# Patient Record
Sex: MALE | Race: BLACK OR AFRICAN AMERICAN | Employment: UNEMPLOYED | URBAN - METROPOLITAN AREA
[De-identification: names, ages, dates, MRNs, and addresses within clinical notes are randomized per-mention and may not be internally consistent; named-entity substitution may affect disease eponyms.]

---

## 2021-04-25 ENCOUNTER — HOSPITAL ENCOUNTER (EMERGENCY)
Age: 41
Discharge: HOME | End: 2021-04-25

## 2021-04-25 VITALS
HEIGHT: 75 IN | OXYGEN SATURATION: 100 % | RESPIRATION RATE: 18 BRPM | SYSTOLIC BLOOD PRESSURE: 139 MMHG | DIASTOLIC BLOOD PRESSURE: 82 MMHG | HEART RATE: 98 BPM | BODY MASS INDEX: 31.21 KG/M2 | TEMPERATURE: 97.8 F | WEIGHT: 251 LBS

## 2021-04-25 DIAGNOSIS — N48.1 BALANITIS: Primary | ICD-10-CM

## 2021-04-25 PROCEDURE — 99283 EMERGENCY DEPT VISIT LOW MDM: CPT

## 2021-04-25 RX ORDER — OXYCODONE AND ACETAMINOPHEN 7.5; 325 MG/1; MG/1
1 TABLET ORAL EVERY 4 HOURS PRN
COMMUNITY

## 2021-04-25 RX ORDER — CLOTRIMAZOLE 1 %
CREAM (GRAM) TOPICAL
Qty: 28 G | Refills: 0 | Status: SHIPPED | OUTPATIENT
Start: 2021-04-25 | End: 2021-05-02

## 2021-04-25 RX ORDER — SULFAMETHOXAZOLE AND TRIMETHOPRIM 400; 80 MG/1; MG/1
1 TABLET ORAL 2 TIMES DAILY
COMMUNITY

## 2021-04-25 SDOH — HEALTH STABILITY: MENTAL HEALTH: HOW OFTEN DO YOU HAVE A DRINK CONTAINING ALCOHOL?: NEVER

## 2021-04-25 ASSESSMENT — ENCOUNTER SYMPTOMS
VOMITING: 0
NAUSEA: 0
ABDOMINAL PAIN: 0

## 2021-04-25 NOTE — ED PROVIDER NOTES
[address] [facility]      Pt Name: Raimundo Madrid  MRN: [id number]  [place] 1980  Date of evaluation: 4/25/2021  Provider: Hari Pineda PA-C    This patient was not seen and evaluated by the attending physician No att. providers found. CHIEF COMPLAINT       Chief Complaint   Patient presents with    Rash     patient states that he woke up this AM with a rash in his \"private area\". pt. denies itching, burning, or pain. pt. states that it \"looks like a purple bruise\". HISTORYOF PRESENT ILLNESS  (Location/Symptom, Timing/Onset, Context/Setting, Quality, Duration, Modifying Factors, Severity.)   Raimundo Madrid is a 41 y.o. male who presents to the emergency department with penile rash which he woke up with this morning. He denies that it is painful or itchy. He denies any dysuria, lesions or sores, discharge, testicular pain or swelling or other symptoms. Nursing Notes were reviewedand I agree. REVIEW OF SYSTEMS    (2-9 systems for level 4, 10 or more forlevel 5)     Review of Systems   Constitutional: Negative for chills and fever. Gastrointestinal: Negative for abdominal pain, nausea and vomiting. Genitourinary: Negative for discharge, dysuria, frequency, genital sores, penile pain, penile swelling, scrotal swelling, testicular pain and urgency. Skin: Positive for rash. All other systems reviewed and are negative. Except as noted above the remainder ofthe review of systems was reviewed and negative. PAST MEDICALHISTORY   History reviewed. No pertinent past medical history. SURGICAL HISTORY           Procedure Laterality Date    ANKLE SURGERY         CURRENT MEDICATIONS       Previous Medications    OXYCODONE-ACETAMINOPHEN (PERCOCET) 7.5-325 MG PER TABLET    Take 1 tablet by mouth every 4 hours as needed for Pain.     SULFAMETHOXAZOLE-TRIMETHOPRIM (BACTRIM;SEPTRA) 400-80 MG PER TABLET    Take 1 tablet by mouth 2 times daily       ALLERGIES     Patient has no known allergies. FAMILY HISTORY     History reviewed. No pertinent family history. No family status information on file. SOCIAL HISTORY    reports that he has never smoked. He has never used smokeless tobacco. He reports current drug use. Drug: Marijuana. He reports that he does not drink alcohol. PHYSICAL EXAM    (up to 7 for level 4, 8 or more for level 5)     ED Triage Vitals [04/25/21 1754]   BP Temp Temp Source Pulse Resp SpO2 Height Weight   139/82 97.8 °F (36.6 °C) Temporal 98 18 100 % 6' 3\" (1.905 m) 251 lb (113.9 kg)       Physical Exam  Vitals signs and nursing note reviewed. Exam conducted with a chaperone present. Constitutional:       General: He is not in acute distress. Appearance: He is well-developed. HENT:      Head: Normocephalic and atraumatic. Neck:      Musculoskeletal: Neck supple. Pulmonary:      Effort: Pulmonary effort is normal. No respiratory distress. Genitourinary:     Pubic Area: No rash. Penis: Circumcised. Erythema present. No discharge or lesions. Testes: Normal.       Musculoskeletal: Normal range of motion. Skin:     General: Skin is warm and dry. Neurological:      Mental Status: He is alert and oriented to person, place, and time. Psychiatric:         Behavior: Behavior normal.              EMERGENCY DEPARTMENT COURSE and DIFFERENTIAL DIAGNOSIS/MDM:   Vitals:    Vitals:    04/25/21 1754   BP: 139/82   Pulse: 98   Resp: 18   Temp: 97.8 °F (36.6 °C)   TempSrc: Temporal   SpO2: 100%   Weight: 251 lb (113.9 kg)   Height: 6' 3\" (1.905 m)        I have evaluated this patient. My supervising physician was available for consultation. The rash is consistent with balanitis and he was treated with clotrimazole. If it does not improve within 7 days I have asked him to follow-up with urology. Discussed results, diagnosis and plan with patient and/or family. Questions addressed.

## 2024-08-10 ENCOUNTER — HOSPITAL ENCOUNTER (EMERGENCY)
Facility: HOSPITAL | Age: 44
Discharge: HOME | End: 2024-08-10
Attending: EMERGENCY MEDICINE

## 2024-08-10 ENCOUNTER — APPOINTMENT (OUTPATIENT)
Dept: CARDIOLOGY | Facility: HOSPITAL | Age: 44
End: 2024-08-10

## 2024-08-10 ENCOUNTER — APPOINTMENT (OUTPATIENT)
Dept: RADIOLOGY | Facility: HOSPITAL | Age: 44
End: 2024-08-10

## 2024-08-10 VITALS
DIASTOLIC BLOOD PRESSURE: 93 MMHG | WEIGHT: 160 LBS | RESPIRATION RATE: 17 BRPM | BODY MASS INDEX: 21.67 KG/M2 | HEIGHT: 72 IN | OXYGEN SATURATION: 100 % | SYSTOLIC BLOOD PRESSURE: 133 MMHG | HEART RATE: 64 BPM | TEMPERATURE: 98.2 F

## 2024-08-10 DIAGNOSIS — K02.9 DENTAL CAVITY: ICD-10-CM

## 2024-08-10 DIAGNOSIS — I10 HYPERTENSION, UNSPECIFIED TYPE: Primary | ICD-10-CM

## 2024-08-10 LAB
ANION GAP SERPL CALC-SCNC: 11 MMOL/L
APPEARANCE UR: CLEAR
BASOPHILS # BLD AUTO: 0.05 X10*3/UL (ref 0–0.1)
BASOPHILS NFR BLD AUTO: 0.9 %
BILIRUB UR STRIP.AUTO-MCNC: NEGATIVE MG/DL
BUN SERPL-MCNC: 12 MG/DL (ref 8–25)
CALCIUM SERPL-MCNC: 9.7 MG/DL (ref 8.5–10.4)
CHLORIDE SERPL-SCNC: 105 MMOL/L (ref 97–107)
CO2 SERPL-SCNC: 24 MMOL/L (ref 24–31)
COLOR UR: ABNORMAL
CREAT SERPL-MCNC: 0.9 MG/DL (ref 0.4–1.6)
EGFRCR SERPLBLD CKD-EPI 2021: >90 ML/MIN/1.73M*2
EOSINOPHIL # BLD AUTO: 0.25 X10*3/UL (ref 0–0.7)
EOSINOPHIL NFR BLD AUTO: 4.6 %
ERYTHROCYTE [DISTWIDTH] IN BLOOD BY AUTOMATED COUNT: 14.2 % (ref 11.5–14.5)
GLUCOSE SERPL-MCNC: 95 MG/DL (ref 65–99)
GLUCOSE UR STRIP.AUTO-MCNC: NORMAL MG/DL
HCT VFR BLD AUTO: 47 % (ref 41–52)
HGB BLD-MCNC: 15.6 G/DL (ref 13.5–17.5)
IMM GRANULOCYTES # BLD AUTO: 0.01 X10*3/UL (ref 0–0.7)
IMM GRANULOCYTES NFR BLD AUTO: 0.2 % (ref 0–0.9)
KETONES UR STRIP.AUTO-MCNC: NEGATIVE MG/DL
LEUKOCYTE ESTERASE UR QL STRIP.AUTO: ABNORMAL
LYMPHOCYTES # BLD AUTO: 2.56 X10*3/UL (ref 1.2–4.8)
LYMPHOCYTES NFR BLD AUTO: 46.8 %
MCH RBC QN AUTO: 28.6 PG (ref 26–34)
MCHC RBC AUTO-ENTMCNC: 33.2 G/DL (ref 32–36)
MCV RBC AUTO: 86 FL (ref 80–100)
MONOCYTES # BLD AUTO: 0.37 X10*3/UL (ref 0.1–1)
MONOCYTES NFR BLD AUTO: 6.8 %
NEUTROPHILS # BLD AUTO: 2.23 X10*3/UL (ref 1.2–7.7)
NEUTROPHILS NFR BLD AUTO: 40.7 %
NITRITE UR QL STRIP.AUTO: NEGATIVE
NRBC BLD-RTO: 0 /100 WBCS (ref 0–0)
PH UR STRIP.AUTO: 6.5 [PH]
PLATELET # BLD AUTO: 230 X10*3/UL (ref 150–450)
POTASSIUM SERPL-SCNC: 4 MMOL/L (ref 3.4–5.1)
PROT UR STRIP.AUTO-MCNC: NEGATIVE MG/DL
RBC # BLD AUTO: 5.46 X10*6/UL (ref 4.5–5.9)
RBC # UR STRIP.AUTO: NEGATIVE /UL
RBC #/AREA URNS AUTO: NORMAL /HPF
SODIUM SERPL-SCNC: 140 MMOL/L (ref 133–145)
SP GR UR STRIP.AUTO: 1.02
UROBILINOGEN UR STRIP.AUTO-MCNC: NORMAL MG/DL
WBC # BLD AUTO: 5.5 X10*3/UL (ref 4.4–11.3)
WBC #/AREA URNS AUTO: NORMAL /HPF

## 2024-08-10 PROCEDURE — 81001 URINALYSIS AUTO W/SCOPE: CPT | Performed by: PHYSICIAN ASSISTANT

## 2024-08-10 PROCEDURE — 87086 URINE CULTURE/COLONY COUNT: CPT | Mod: WESLAB | Performed by: PHYSICIAN ASSISTANT

## 2024-08-10 PROCEDURE — 93005 ELECTROCARDIOGRAM TRACING: CPT

## 2024-08-10 PROCEDURE — 99283 EMERGENCY DEPT VISIT LOW MDM: CPT | Mod: 25

## 2024-08-10 PROCEDURE — 71046 X-RAY EXAM CHEST 2 VIEWS: CPT | Performed by: RADIOLOGY

## 2024-08-10 PROCEDURE — 80048 BASIC METABOLIC PNL TOTAL CA: CPT | Performed by: PHYSICIAN ASSISTANT

## 2024-08-10 PROCEDURE — 36415 COLL VENOUS BLD VENIPUNCTURE: CPT | Performed by: PHYSICIAN ASSISTANT

## 2024-08-10 PROCEDURE — 85025 COMPLETE CBC W/AUTO DIFF WBC: CPT | Performed by: PHYSICIAN ASSISTANT

## 2024-08-10 PROCEDURE — 2500000002 HC RX 250 W HCPCS SELF ADMINISTERED DRUGS (ALT 637 FOR MEDICARE OP, ALT 636 FOR OP/ED): Performed by: CLINICAL NURSE SPECIALIST

## 2024-08-10 PROCEDURE — 71046 X-RAY EXAM CHEST 2 VIEWS: CPT

## 2024-08-10 PROCEDURE — 2500000001 HC RX 250 WO HCPCS SELF ADMINISTERED DRUGS (ALT 637 FOR MEDICARE OP): Performed by: CLINICAL NURSE SPECIALIST

## 2024-08-10 RX ORDER — LOSARTAN POTASSIUM 100 MG/1
100 TABLET ORAL DAILY
Qty: 30 TABLET | Refills: 0 | Status: SHIPPED | OUTPATIENT
Start: 2024-08-10 | End: 2024-09-09

## 2024-08-10 RX ORDER — PENICILLIN V POTASSIUM 250 MG/1
500 TABLET, FILM COATED ORAL ONCE
Status: COMPLETED | OUTPATIENT
Start: 2024-08-10 | End: 2024-08-10

## 2024-08-10 RX ORDER — LOSARTAN POTASSIUM 100 MG/1
100 TABLET ORAL ONCE
Status: COMPLETED | OUTPATIENT
Start: 2024-08-10 | End: 2024-08-10

## 2024-08-10 RX ORDER — PENICILLIN V POTASSIUM 500 MG/1
500 TABLET, FILM COATED ORAL 4 TIMES DAILY
Qty: 28 TABLET | Refills: 0 | Status: SHIPPED | OUTPATIENT
Start: 2024-08-10 | End: 2024-08-17

## 2024-08-10 RX ADMIN — LOSARTAN POTASSIUM 100 MG: 100 TABLET, FILM COATED ORAL at 21:56

## 2024-08-10 RX ADMIN — PENICILLIN V POTASSIUM 500 MG: 250 TABLET, FILM COATED ORAL at 22:55

## 2024-08-10 ASSESSMENT — LIFESTYLE VARIABLES
HAVE YOU EVER FELT YOU SHOULD CUT DOWN ON YOUR DRINKING: NO
EVER HAD A DRINK FIRST THING IN THE MORNING TO STEADY YOUR NERVES TO GET RID OF A HANGOVER: NO
HAVE PEOPLE ANNOYED YOU BY CRITICIZING YOUR DRINKING: NO
EVER FELT BAD OR GUILTY ABOUT YOUR DRINKING: NO
TOTAL SCORE: 0

## 2024-08-10 ASSESSMENT — PAIN SCALES - GENERAL
PAINLEVEL_OUTOF10: 0 - NO PAIN
PAINLEVEL_OUTOF10: 0 - NO PAIN

## 2024-08-10 ASSESSMENT — PAIN - FUNCTIONAL ASSESSMENT: PAIN_FUNCTIONAL_ASSESSMENT: 0-10

## 2024-08-10 ASSESSMENT — COLUMBIA-SUICIDE SEVERITY RATING SCALE - C-SSRS
6. HAVE YOU EVER DONE ANYTHING, STARTED TO DO ANYTHING, OR PREPARED TO DO ANYTHING TO END YOUR LIFE?: NO
2. HAVE YOU ACTUALLY HAD ANY THOUGHTS OF KILLING YOURSELF?: NO
1. IN THE PAST MONTH, HAVE YOU WISHED YOU WERE DEAD OR WISHED YOU COULD GO TO SLEEP AND NOT WAKE UP?: NO

## 2024-08-11 LAB — HOLD SPECIMEN: NORMAL

## 2024-08-11 NOTE — DISCHARGE INSTRUCTIONS
Continue to floss and brush and brush teeth.  Salt water gargles after eating and 4 times a day.  Follow-up with dentist  Journal your blood pressure daily  Take your blood pressure medication as directed  Return with any worsening symptoms or concerns  Follow-up with primary care physician in 2 days for reevaluation

## 2024-08-11 NOTE — ED PROVIDER NOTES
"Department of Emergency Medicine   ED  Provider Note  Admit Date/RoomTime: 8/10/2024  9:02 PM  ED Room: ST27/ST27        History of Present Illness:  Chief Complaint   Patient presents with    Hypertension     Patient is supposed to be on BP meds but hasn't francisco javier taking them          Cameron Agarwal is a 44 y.o. male history of hypertension.  Reports he feels like his blood pressure is elevated.  He has been without his medication for some time.  Due to lack of insurance.  And also reports \"I have been stupid \"denies headache blurred vision chest pain shortness of breath.  No abdominal pain nausea vomiting or diarrhea.  States he has been in his normal state of health and feels like his blood pressure is elevated.  Upon arrival blood pressure noted to be 160/111.  Patient is unsure of the name of his blood pressure medication he has a bottle at home upon review of the chart he is on losartan 100 mg daily    Review of Systems:   Pertinent positives and negatives are stated within HPI, all other systems reviewed and are negative.        --------------------------------------------- PAST HISTORY ---------------------------------------------  Past Medical History:  has no past medical history on file.  Past Surgical History:  has no past surgical history on file.  Social History:    Family History: family history is not on file.. Unless otherwise noted, family history is non contributory  The patient’s home medications have been reviewed.  Allergies: Patient has no known allergies.        ---------------------------------------------------PHYSICAL EXAM--------------------------------------    GENERAL APPEARANCE: Awake and alert.   VITAL SIGNS: As per the nurses' triage record.  Blood pressure 160/111  HEENT: Normocephalic, atraumatic. Extraocular muscles are intact. Pupils equal round and reactive to light. Conjunctiva are pink. Negative scleral icterus. Mucous membranes are moist. Tongue in the midline. Pharynx was " without erythema or exudates, uvula midline.  Right lower jawline patient has a broken off decayed molar yellow in color no fluctuant abscess to drain.  No sign of liquids angina or peritonsillar abscess.  No stridor or trismus noted.  No facial lacerations or abrasions rashes cellulitis noted.  Able to open and shut the jaw with no difficulty.  NECK: Soft Nontender and supple, full gross ROM, no meningeal signs.  CHEST: Nontender to palpation. Clear to auscultation bilaterally. No rales, rhonchi, or wheezing.   HEART: S1, S2. Regular rate and rhythm. No murmurs, gallops or rubs.  Strong and equal pulses in the extremities.   ABDOMEN: Soft, nontender, nondistended, positive bowel sounds, no palpable masses.  MUSCULCSKELETAL: Full gross active range of motion. Ambulating on own with no acute difficulties  NEUROLOGICAL: Awake, alert and oriented x 3. Power intact in the upper and lower extremities. Sensation is intact to light touch in the upper and lower extremities.   IMMUNOLOGICAL: No lymphatic streaking noted   DERM: No petechiae, rashes, or ecchymoses.          ------------------------- NURSING NOTES AND VITALS REVIEWED ---------------------------  The nursing notes within the ED encounter and vital signs as below have been reviewed by myself  BP (!) 133/93 (BP Location: Left arm, Patient Position: Sitting)   Pulse 64   Temp 36.8 °C (98.2 °F) (Temporal)   Resp 17   Ht 1.829 m (6')   Wt 72.6 kg (160 lb)   SpO2 100%   BMI 21.70 kg/m²     Oxygen Saturation Interpretation: 100% room air      The patient’s available past medical records and past encounters were reviewed.          -----------------------DIAGNOSTIC RESULTS------------------------  LABS:    Labs Reviewed   URINALYSIS WITH REFLEX CULTURE AND MICROSCOPIC - Abnormal       Result Value    Color, Urine Light-Yellow      Appearance, Urine Clear      Specific Gravity, Urine 1.021      pH, Urine 6.5      Protein, Urine NEGATIVE      Glucose, Urine Normal       Blood, Urine NEGATIVE      Ketones, Urine NEGATIVE      Bilirubin, Urine NEGATIVE      Urobilinogen, Urine Normal      Nitrite, Urine NEGATIVE      Leukocyte Esterase, Urine 75 Honorio/µL (*)    BASIC METABOLIC PANEL - Normal    Glucose 95      Sodium 140      Potassium 4.0      Chloride 105      Bicarbonate 24      Urea Nitrogen 12      Creatinine 0.90      eGFR >90      Calcium 9.7      Anion Gap 11     MICROSCOPIC ONLY, URINE - Normal    WBC, Urine 1-5      RBC, Urine NONE     URINE CULTURE   CBC WITH AUTO DIFFERENTIAL    WBC 5.5      nRBC 0.0      RBC 5.46      Hemoglobin 15.6      Hematocrit 47.0      MCV 86      MCH 28.6      MCHC 33.2      RDW 14.2      Platelets 230      Neutrophils % 40.7      Immature Granulocytes %, Automated 0.2      Lymphocytes % 46.8      Monocytes % 6.8      Eosinophils % 4.6      Basophils % 0.9      Neutrophils Absolute 2.23      Immature Granulocytes Absolute, Automated 0.01      Lymphocytes Absolute 2.56      Monocytes Absolute 0.37      Eosinophils Absolute 0.25      Basophils Absolute 0.05     URINALYSIS WITH REFLEX CULTURE AND MICROSCOPIC    Narrative:     The following orders were created for panel order Urinalysis with Reflex Culture and Microscopic.  Procedure                               Abnormality         Status                     ---------                               -----------         ------                     Urinalysis with Reflex C...[[id number]]  Abnormal            Final result               Extra Urine Gray Tube[[id number]]                            In process                   Please view results for these tests on the individual orders.   EXTRA URINE GRAY TUBE       As interpreted by me, the above displayed labs are abnormal. All other labs obtained during this visit were within normal range or not returned as of this dictation.      EKG Interpretation  2307 ECG performed on August 10, 2024 at 2221 and interpreted by me at 2225 showing a normal sinus  rhythm, no axis deviation, intervals within normal limits, early repolarization, no STEMI.  Similar to previous from June 5, 2017. [EG]           XR chest 2 views   Final Result   1. No acute cardiopulmonary process.        MACRO:   None.        Signed by: Liza Lara 8/10/2024 9:38 PM   Dictation workstation:   [id number]              XR chest 2 views   Final Result   1. No acute cardiopulmonary process.        MACRO:   None.        Signed by: Liza Lara 8/10/2024 9:38 PM   Dictation workstation:   [id number]              ------------------------------ ED COURSE/MEDICAL DECISION MAKING----------------------  Medical Decision Making:   Exam: A medically appropriate exam performed, outlined above, given the known history and presentation.    History obtained from: Review of medical record nursing notes patient      Social Determinants of Health considered during this visit: Takes care of himself at home      PAST MEDICAL HISTORY/Chronic Conditions Affecting Care     has no past medical history on file.       CC/HPI Summary, Social Determinants of health, Records Reviewed, DDx, testing done/not done, ED Course, Reassessment, disposition considerations/shared decision making with patient, consults, disposition:   Presents with concerns as blood pressure is elevated patient noted to have blood pressure elevation on initial presentation of 160/111.  He has been out of his medication.  He has no complaints.  Plan  BMP  CBC  Urine  Losartan  Pen-Vee K  Chest x-ray 1. No acute cardiopulmonary process.   EKG  Urine    Medical Decision Making/Differential Diagnosis:  Differentials include but not limited to hypertension due to noncompliance medication versus acute renal failure versus electrolyte abnormality versus tooth decay versus dental abscess.  No sign of Yunior angina or peritonsillar abscess.  No nuchal rigidity stridor or trismus noted.  No signs of toxicity.    Review  Glucose 95  Electrolytes within  normal limits  Normal renal function  Leukocytes 5.5  Hemoglobin 15.6  Leukocytes 75 in the urine otherwise unremarkable no complaints of urinary symptoms.  Sent for culture  Patient presented with feeling like his blood pressure was elevated.  On initial presentation blood pressure is elevated he has been noncompliant with his medication.  Patient chart was reviewed noted to be on losartan 100 mg daily.  Patient was given this medication in the emergency department improvement of blood pressure 143/89.  Electrolytes within normal limits normal renal function.  He is not anemic no elevation white blood cell count indicate infection he is not hypoglycemic.  Urine did show abnormal finding however no urinary symptoms we will send for culture.  EKG per attending note no ST elevation or arrhythmia noted.  Based on patient's clinical presentation symptoms consistent with hypertension history of the same will start back on home medications.  Patient also notified nursing that he had tooth pain.  No signs of Yunior angina or peritonsillar abscess.  No rashes lesions or sores noted to the face or scalp.  Right back molar broken decayed no drainable abscess..  Placed on penicillin advised him to follow-up with primary care and dentist.  He is to journal his blood pressure daily for his primary care physician.  Continue with his medication return with any worsening symptoms or concerns patient is been advised on the importance of blood pressure management.  Including risk of stroke kidney failure heart disease verbalizes understanding amenable to plan  Patient seen evaluated with attending physician Dr. Lynne   PROCEDURES  Unless otherwise noted below, none      CONSULTS:   None      ED Course as of 08/11/24 0205   Sat Aug 10, 2024   2307 ECG performed on August 10, 2024 at 2221 and interpreted by me at 2225 showing a normal sinus rhythm, no axis deviation, intervals within normal limits, early repolarization, no STEMI.   Similar to previous from June 5, 2017. [EG]      ED Course User Index  [EG] Mary Ann Peacock MD         Diagnoses as of 08/11/24 0205   Hypertension, unspecified type   Dental cavity         This patient has remained hemodynamically stable during their ED course.      Critical Care: none        Counseling:  The emergency provider has spoken with the patient and discussed today’s results, in addition to providing specific details for the plan of care and counseling regarding the diagnosis and prognosis.  Questions are answered at this time and they are agreeable with the plan.         --------------------------------- IMPRESSION AND DISPOSITION ---------------------------------    IMPRESSION  1. Hypertension, unspecified type    2. Dental cavity        DISPOSITION  Disposition: Discharge home  Patient condition is stable improved        NOTE: This report was transcribed using voice recognition software. Every effort was made to ensure accuracy; however, inadvertent computerized transcription errors may be present      DEISY Muñiz-CHERELLE  08/11/24 0205

## 2024-08-12 LAB — BACTERIA UR CULT: NO GROWTH

## 2024-08-22 ENCOUNTER — TELEPHONE (OUTPATIENT)
Dept: PRIMARY CARE | Facility: CLINIC | Age: 44
End: 2024-08-22

## 2024-08-22 DIAGNOSIS — I10 HYPERTENSION, UNSPECIFIED TYPE: ICD-10-CM

## 2024-08-22 RX ORDER — LOSARTAN POTASSIUM 100 MG/1
100 TABLET ORAL DAILY
Qty: 30 TABLET | Refills: 0 | Status: SHIPPED | OUTPATIENT
Start: 2024-08-22 | End: 2024-09-21

## 2024-10-03 ENCOUNTER — APPOINTMENT (OUTPATIENT)
Dept: PRIMARY CARE | Facility: CLINIC | Age: 44
End: 2024-10-03

## 2024-10-03 VITALS
DIASTOLIC BLOOD PRESSURE: 84 MMHG | WEIGHT: 166 LBS | BODY MASS INDEX: 22.48 KG/M2 | HEART RATE: 118 BPM | HEIGHT: 72 IN | TEMPERATURE: 98 F | SYSTOLIC BLOOD PRESSURE: 139 MMHG

## 2024-10-03 DIAGNOSIS — Z13.6 SCREENING FOR CARDIOVASCULAR CONDITION: ICD-10-CM

## 2024-10-03 DIAGNOSIS — I10 HYPERTENSION, UNSPECIFIED TYPE: ICD-10-CM

## 2024-10-03 DIAGNOSIS — Z12.11 ENCOUNTER FOR SCREENING FOR MALIGNANT NEOPLASM OF COLON: ICD-10-CM

## 2024-10-03 DIAGNOSIS — R73.02 IGT (IMPAIRED GLUCOSE TOLERANCE): ICD-10-CM

## 2024-10-03 DIAGNOSIS — I10 PRIMARY HYPERTENSION: Primary | ICD-10-CM

## 2024-10-03 DIAGNOSIS — Z72.0 TOBACCO ABUSE: ICD-10-CM

## 2024-10-03 PROCEDURE — 3079F DIAST BP 80-89 MM HG: CPT | Performed by: INTERNAL MEDICINE

## 2024-10-03 PROCEDURE — 99214 OFFICE O/P EST MOD 30 MIN: CPT | Performed by: INTERNAL MEDICINE

## 2024-10-03 PROCEDURE — 3008F BODY MASS INDEX DOCD: CPT | Performed by: INTERNAL MEDICINE

## 2024-10-03 PROCEDURE — 4004F PT TOBACCO SCREEN RCVD TLK: CPT | Performed by: INTERNAL MEDICINE

## 2024-10-03 PROCEDURE — 3075F SYST BP GE 130 - 139MM HG: CPT | Performed by: INTERNAL MEDICINE

## 2024-10-03 PROCEDURE — 99406 BEHAV CHNG SMOKING 3-10 MIN: CPT | Performed by: INTERNAL MEDICINE

## 2024-10-03 RX ORDER — LOSARTAN POTASSIUM 100 MG/1
100 TABLET ORAL DAILY
Qty: 90 TABLET | Refills: 3 | Status: SHIPPED | OUTPATIENT
Start: 2024-10-03 | End: 2025-10-03

## 2024-10-03 ASSESSMENT — ENCOUNTER SYMPTOMS
CHILLS: 0
FEVER: 0
PALPITATIONS: 0
POLYDIPSIA: 0
SHORTNESS OF BREATH: 0
COUGH: 0

## 2024-10-03 NOTE — PROGRESS NOTES
Subjective   Patient ID: Cameron Agarwal is a 44 y.o. male who presents for Follow-up (Overdue FUV).    44-year-old male presents today for overdue follow-up last seen 2 years ago he was off of his blood pressure medication for at least the last year.  He recognized that he was having symptoms possibly related to blood pressure being uncontrolled went to the ED was evaluated and treated given a 30-day supply of his previous blood pressure medication.  Since that time he is felt well had no complications or side effects no concerns specifically at this time.  Medication was reviewed and updated additional lab work ordered for routine care and patient was counseled about the option for colon cancer screening at this time based on the fact he is now over the age of 45.  He was provided education about available options and elected into considering these options and notify me at a later time about colon cancer screening preferences.  He was also screened for tobacco use, continues to smoke and is precontemplative for quitting at this time.  He is considering quitting but is not actively trying at this time.  Patient offered medication assistance or treatment assistance when the time comes that he is prepared for quitting.         Review of Systems   Constitutional:  Negative for chills and fever.   Respiratory:  Negative for cough and shortness of breath.    Cardiovascular:  Negative for chest pain and palpitations.   Endocrine: Negative for polydipsia and polyuria.       Objective   /84 (BP Location: Right arm, Patient Position: Sitting, BP Cuff Size: Large adult)   Pulse (!) 118   Temp 36.7 °C (98 °F) (Temporal)   Ht 1.829 m (6')   Wt 75.3 kg (166 lb)   BMI 22.51 kg/m²     Physical Exam  Constitutional:       Appearance: Normal appearance.   HENT:      Head: Normocephalic and atraumatic.   Eyes:      Extraocular Movements: Extraocular movements intact.      Pupils: Pupils are equal, round, and reactive to  light.   Neck:      Thyroid: No thyroid mass or thyromegaly.   Cardiovascular:      Rate and Rhythm: Normal rate and regular rhythm.   Musculoskeletal:      Cervical back: Neck supple.      Right lower leg: No edema.      Left lower leg: No edema.   Neurological:      Mental Status: He is alert.         Assessment/Plan   Assessment & Plan  Primary hypertension    Orders:    Lipid Panel; Future    Hemoglobin A1C; Future    IGT (impaired glucose tolerance)    Orders:    Lipid Panel; Future    Hemoglobin A1C; Future    Encounter for screening for malignant neoplasm of colon  Patient aware of screening options, will notify when ready       Hypertension, unspecified type    Orders:    losartan (Cozaar) 100 mg tablet; Take 1 tablet (100 mg) by mouth once daily.    Tobacco abuse  Precontemplative patient considering       Screening for cardiovascular condition    Orders:    Lipid Panel; Future    Hemoglobin A1C; Future

## 2025-03-01 ENCOUNTER — HOSPITAL ENCOUNTER (EMERGENCY)
Facility: HOSPITAL | Age: 45
Discharge: HOME | End: 2025-03-01
Payer: COMMERCIAL

## 2025-03-01 VITALS
RESPIRATION RATE: 20 BRPM | BODY MASS INDEX: 23.03 KG/M2 | DIASTOLIC BLOOD PRESSURE: 74 MMHG | OXYGEN SATURATION: 100 % | HEIGHT: 72 IN | SYSTOLIC BLOOD PRESSURE: 137 MMHG | TEMPERATURE: 98.6 F | WEIGHT: 170 LBS | HEART RATE: 84 BPM

## 2025-03-01 DIAGNOSIS — J01.90 ACUTE NON-RECURRENT SINUSITIS, UNSPECIFIED LOCATION: Primary | ICD-10-CM

## 2025-03-01 LAB
FLUAV RNA RESP QL NAA+PROBE: NOT DETECTED
FLUBV RNA RESP QL NAA+PROBE: NOT DETECTED
RSV RNA RESP QL NAA+PROBE: NOT DETECTED
SARS-COV-2 RNA RESP QL NAA+PROBE: NOT DETECTED

## 2025-03-01 PROCEDURE — 99283 EMERGENCY DEPT VISIT LOW MDM: CPT

## 2025-03-01 PROCEDURE — 87637 SARSCOV2&INF A&B&RSV AMP PRB: CPT | Performed by: CLINICAL NURSE SPECIALIST

## 2025-03-01 RX ORDER — AMOXICILLIN AND CLAVULANATE POTASSIUM 875; 125 MG/1; MG/1
1 TABLET, FILM COATED ORAL 2 TIMES DAILY
Qty: 14 TABLET | Refills: 0 | Status: SHIPPED | OUTPATIENT
Start: 2025-03-01 | End: 2025-03-01

## 2025-03-01 RX ORDER — AMOXICILLIN AND CLAVULANATE POTASSIUM 875; 125 MG/1; MG/1
1 TABLET, FILM COATED ORAL 2 TIMES DAILY
Qty: 14 TABLET | Refills: 0 | Status: SHIPPED | OUTPATIENT
Start: 2025-03-01 | End: 2025-03-08

## 2025-03-01 RX ORDER — GUAIFENESIN 100 MG/5ML
200 SOLUTION ORAL 3 TIMES DAILY PRN
Qty: 120 ML | Refills: 0 | Status: SHIPPED | OUTPATIENT
Start: 2025-03-01 | End: 2025-03-06

## 2025-03-01 RX ORDER — GUAIFENESIN 100 MG/5ML
200 SOLUTION ORAL 3 TIMES DAILY PRN
Qty: 120 ML | Refills: 0 | Status: SHIPPED | OUTPATIENT
Start: 2025-03-01 | End: 2025-03-01

## 2025-03-01 ASSESSMENT — COLUMBIA-SUICIDE SEVERITY RATING SCALE - C-SSRS
2. HAVE YOU ACTUALLY HAD ANY THOUGHTS OF KILLING YOURSELF?: NO
6. HAVE YOU EVER DONE ANYTHING, STARTED TO DO ANYTHING, OR PREPARED TO DO ANYTHING TO END YOUR LIFE?: NO
1. IN THE PAST MONTH, HAVE YOU WISHED YOU WERE DEAD OR WISHED YOU COULD GO TO SLEEP AND NOT WAKE UP?: NO

## 2025-03-01 NOTE — ED TRIAGE NOTES
Pt states that he was at Urgent Care on Thursday for flu like symptoms and congestion. Pt states that they gave him a steroid, cetrizine, and flonase. Pt states he has no relief. Pt states he was negative for COVID at that time.

## 2025-03-01 NOTE — DISCHARGE INSTRUCTIONS
Continue with your current medications.  Take prednisone with food.  Warm compresses to the face help with sinus congestion.  Salt water gargles for sore throat pain and drainage warm compresses to the face to help thin out sinuses.  Coolmist vaporizer in the home.  Tylenol Motrin for pain do not go the recommended daily dosage watch using over-the-counter medication with your high blood pressure.  Use blood pressure specific medications to help with your symptoms.  Take antibiotic until completed take with food, full glass of water in supplement yogurt into her diet up with side effect of diarrhea

## 2025-03-01 NOTE — ED PROVIDER NOTES
Department of Emergency Medicine   ED  Provider Note  Admit Date/RoomTime: 3/1/2025  8:21 AM  ED Room: ST24/ST24        History of Present Illness:  Chief Complaint   Patient presents with    Nasal Congestion         Cameron Agarwal is a 44 y.o. male history of hypertension presents to the emergency department with complaints of facial pain and pressure runny stuffy nose.  Onset of symptoms over 1 week ago patient was seen evaluated in urgent care on Thursday of this week diagnosed with viral illness given steroids Flonase and cetrizine reports that his symptoms are not getting any better.  He states when he smokes his symptoms are worse.  He denies any wheezing or shortness of breath.  No abdominal pain nausea vomiting or diarrhea.  Denies any fever or chills.  No cough.  Has tried supportive care measures at home with no improvement  Review of Systems:   Pertinent positives and negatives are stated within HPI, all other systems reviewed and are negative.        --------------------------------------------- PAST HISTORY ---------------------------------------------  Past Medical History:  has no past medical history on file.  Past Surgical History:  has no past surgical history on file.  Social History:  reports that he has been smoking cigarettes. He has never used smokeless tobacco. He reports current alcohol use. He reports that he does not use drugs.  Family History: family history is not on file.. Unless otherwise noted, family history is non contributory  The patient’s home medications have been reviewed.  Allergies: Patient has no known allergies.        ---------------------------------------------------PHYSICAL EXAM--------------------------------------    GENERAL APPEARANCE: Awake and alert.   VITAL SIGNS: As per the nurses' triage record.  Elevated blood pressure 140/98  HEENT: Normocephalic, atraumatic. Extraocular muscles are intact. Pupils equal round and reactive to light. Conjunctiva are pink.  Negative scleral icterus. Mucous membranes are moist. Tongue in the midline. Pharynx was without erythema or exudates, uvula midline.  Bilateral tympanic membranes pearly gray and intact.  No sign of otitis media otitis externa or mastoiditis.  Tenderness to palpation of the maxillary sinuses  NECK: Soft Nontender and supple, full gross ROM, no meningeal signs.  CHEST: Nontender to palpation. Clear to auscultation bilaterally. No rales, rhonchi, or wheezing.   HEART: S1, S2. Regular rate and rhythm. No murmurs, gallops or rubs.  Strong and equal pulses in the extremities.   ABDOMEN: Soft, nontender, nondistended, positive bowel sounds, no palpable masses.  MUSCULCSKELETAL: The calves are nontender to palpation.  Ambulating on own with no acute difficulties  NEUROLOGICAL: Awake, alert and oriented x 3. Power intact in the upper and lower extremities. Sensation is intact to light touch in the upper and lower extremities.   IMMUNOLOGICAL: No lymphatic streaking noted   DERM: No petechiae, rashes, or ecchymoses.          ------------------------- NURSING NOTES AND VITALS REVIEWED ---------------------------  The nursing notes within the ED encounter and vital signs as below have been reviewed by myself  BP (!) 140/98   Pulse 97   Temp 37 °C (98.6 °F) (Temporal)   Resp 20   Ht 1.829 m (6')   Wt 77.1 kg (170 lb)   SpO2 100%   BMI 23.06 kg/m²     Oxygen Saturation Interpretation: 100% room air      The patient’s available past medical records and past encounters were reviewed.          -----------------------DIAGNOSTIC RESULTS------------------------  LABS:    Labs Reviewed   SARS-COV-2 AND INFLUENZA A/B PCR - Normal       Result Value    Flu A Result Not Detected      Flu B Result Not Detected      Coronavirus 2019, PCR Not Detected      Narrative:     This assay is an FDA-cleared, in vitro diagnostic nucleic acid amplification test for the qualitative detection and differentiation of SARS CoV-2/ Influenza A/B from  nasopharyngeal specimens collected from individuals with signs and symptoms of respiratory tract infections, and has been validated for use at [facility]. Negative results do not preclude COVID-19/ Influenza A/B infections and should not be used as the sole basis for diagnosis, treatment, or other management decisions. Testing for SARS CoV-2 is recommended only for patients who meet current clinical and/or epidemiological criteria defined by federal, state, or local public health directives.   RSV PCR - Normal    RSV PCR Not Detected      Narrative:     This assay is an FDA-cleared, in vitro diagnostic nucleic acid amplification test for the detection of RSV from nasopharyngeal specimens, and has been validated for use at [facility]. Negative results do not preclude RSV infections, and should not be used as the sole basis for diagnosis, treatment, or other management decisions. If Influenza A/B and RSV PCR results are negative, testing for Parainfluenza virus, Adenovirus and Metapneumovirus is routinely performed for pediatric oncology and intensive care inpatients at [facility], and is available on other patients by placing an add-on request.           As interpreted by me, the above displayed labs are abnormal. All other labs obtained during this visit were within normal range or not returned as of this dictation.  ------------------------------ ED COURSE/MEDICAL DECISION MAKING----------------------  Medical Decision Making:   Exam: A medically appropriate exam performed, outlined above, given the known history and presentation.    History obtained from: Review of medical record nursing notes patient      Social Determinants of Health considered during this visit: Takes care of himself at home      PAST MEDICAL HISTORY/Chronic Conditions Affecting Care     has no past medical history on file.       CC/HPI Summary, Social Determinants of health, Records Reviewed, DDx,  testing done/not done, ED Course, Reassessment, disposition considerations/shared decision making with patient, consults, disposition:   Presents to the emergency department with complaints of sinus congestion onset of symptoms over a week no improvement with over-the-counter medications or steroids and allergy medications.  Differentials include not limited to viral illness versus COVID versus flu versus RSV versus acute sinusitis.  No sign of respiratory distress.  Patient is well-nourished well-hydrated no sign of sepsis or toxicity.  He is not hypoxic tachycardic or hypotensive.  Blood pressure is elevated with history of hypertension.  Advised to follow-up with primary care physician journal with blood pressure daily.  Patient has been advised to do supportive care measures at home increasing fluids warm compresses to the face Tylenol Motrin for pain continue with his over-the-counter Flonase nasal spray steroids take with food risk and benefits of steroids reviewed with him.  In his cetrizine .  Coolmist vaporizer in the home avoid smoking close follow-up with primary care based on patient's clinical presentation history and symptoms consistent with sinusitis likely bacterial due to no improvement with over-the-counter medications steroids allergy medications.  Placed on antibiotic therapy Augmentin advised to take with food full glass 1 in supplement yogurt into diet.  Side effect of diarrhea.  COVID flu RSV negative.  Patient is amenable to plan amenable to discharge CMT for discharge  patient seen independently attending physician available for consultation if needed        PROCEDURES  Unless otherwise noted below, none      CONSULTS:   None      ED Course as of 03/01/25 0932   Sat Mar 01, 2025   0932 Patient's pharmacy updated to pharmacy of choice prescriptions resent [TB]      ED Course User Index  [TB] DEISY Muñiz-CNP         Diagnoses as of 03/01/25 0932   Acute non-recurrent sinusitis,  unspecified location         This patient has remained hemodynamically stable during their ED course.      Critical Care: None      Counseling:  The emergency provider has spoken with the patient and discussed today’s results, in addition to providing specific details for the plan of care and counseling regarding the diagnosis and prognosis.  Questions are answered at this time and they are agreeable with the plan.         --------------------------------- IMPRESSION AND DISPOSITION ---------------------------------    IMPRESSION  1. Acute non-recurrent sinusitis, unspecified location        DISPOSITION  Disposition: Discharge home  Patient condition is stable        NOTE: This report was transcribed using voice recognition software. Every effort was made to ensure accuracy; however, inadvertent computerized transcription errors may be present      SMITH Muñiz  03/01/25 0924       SMITH Muñiz  03/01/25 0932       SMITH Muñiz  03/01/25 [id number]

## 2025-03-01 NOTE — Clinical Note
Cameron Agarwal was seen and treated in our emergency department on 3/1/2025.  He may return to work on 03/04/2025.  1-3 days if needed      If you have any questions or concerns, please don't hesitate to call.      Sherrie Carr, APRN-CNP

## 2025-04-04 ENCOUNTER — HOSPITAL ENCOUNTER (EMERGENCY)
Facility: HOSPITAL | Age: 45
Discharge: HOME | End: 2025-04-04
Payer: COMMERCIAL

## 2025-04-04 ENCOUNTER — APPOINTMENT (OUTPATIENT)
Dept: CARDIOLOGY | Facility: HOSPITAL | Age: 45
End: 2025-04-04
Payer: COMMERCIAL

## 2025-04-04 ENCOUNTER — APPOINTMENT (OUTPATIENT)
Dept: RADIOLOGY | Facility: HOSPITAL | Age: 45
End: 2025-04-04
Payer: COMMERCIAL

## 2025-04-04 VITALS
TEMPERATURE: 99.1 F | SYSTOLIC BLOOD PRESSURE: 140 MMHG | BODY MASS INDEX: 23.13 KG/M2 | DIASTOLIC BLOOD PRESSURE: 85 MMHG | OXYGEN SATURATION: 100 % | HEIGHT: 72 IN | WEIGHT: 170.8 LBS | RESPIRATION RATE: 18 BRPM | HEART RATE: 78 BPM

## 2025-04-04 DIAGNOSIS — E86.0 DEHYDRATION: ICD-10-CM

## 2025-04-04 DIAGNOSIS — M79.10 MYALGIA: Primary | ICD-10-CM

## 2025-04-04 LAB
ALBUMIN SERPL BCP-MCNC: 4.7 G/DL (ref 3.4–5)
ALP SERPL-CCNC: 51 U/L (ref 33–120)
ALT SERPL W P-5'-P-CCNC: 24 U/L (ref 10–52)
AMPHETAMINES UR QL SCN: NORMAL
ANION GAP SERPL CALCULATED.3IONS-SCNC: 11 MMOL/L (ref 10–20)
APAP SERPL-MCNC: <10 UG/ML
APPEARANCE UR: CLEAR
AST SERPL W P-5'-P-CCNC: 27 U/L (ref 9–39)
ATRIAL RATE: 94 BPM
BARBITURATES UR QL SCN: NORMAL
BASOPHILS # BLD AUTO: 0.03 X10*3/UL (ref 0–0.1)
BASOPHILS NFR BLD AUTO: 0.6 %
BENZODIAZ UR QL SCN: NORMAL
BILIRUB SERPL-MCNC: 0.3 MG/DL (ref 0–1.2)
BILIRUB UR STRIP.AUTO-MCNC: NEGATIVE MG/DL
BUN SERPL-MCNC: 6 MG/DL (ref 6–23)
BZE UR QL SCN: NORMAL
CALCIUM SERPL-MCNC: 9.9 MG/DL (ref 8.6–10.3)
CANNABINOIDS UR QL SCN: NORMAL
CHLORIDE SERPL-SCNC: 102 MMOL/L (ref 98–107)
CK SERPL-CCNC: 267 U/L (ref 0–325)
CO2 SERPL-SCNC: 29 MMOL/L (ref 21–32)
COLOR UR: COLORLESS
CREAT SERPL-MCNC: 0.85 MG/DL (ref 0.5–1.3)
D DIMER PPP FEU-MCNC: <0.19 MG/L FEU (ref 0.19–0.5)
EGFRCR SERPLBLD CKD-EPI 2021: >90 ML/MIN/1.73M*2
EOSINOPHIL # BLD AUTO: 0.22 X10*3/UL (ref 0–0.7)
EOSINOPHIL NFR BLD AUTO: 4.2 %
ERYTHROCYTE [DISTWIDTH] IN BLOOD BY AUTOMATED COUNT: 14.1 % (ref 11.5–14.5)
ETHANOL SERPL-MCNC: <10 MG/DL
FENTANYL+NORFENTANYL UR QL SCN: NORMAL
FLUAV RNA RESP QL NAA+PROBE: NOT DETECTED
FLUBV RNA RESP QL NAA+PROBE: NOT DETECTED
GLUCOSE SERPL-MCNC: 116 MG/DL (ref 74–99)
GLUCOSE UR STRIP.AUTO-MCNC: NORMAL MG/DL
HCT VFR BLD AUTO: 46.7 % (ref 41–52)
HGB BLD-MCNC: 15.6 G/DL (ref 13.5–17.5)
HOLD SPECIMEN: NORMAL
IMM GRANULOCYTES # BLD AUTO: 0.01 X10*3/UL (ref 0–0.7)
IMM GRANULOCYTES NFR BLD AUTO: 0.2 % (ref 0–0.9)
KETONES UR STRIP.AUTO-MCNC: NEGATIVE MG/DL
LEUKOCYTE ESTERASE UR QL STRIP.AUTO: NEGATIVE
LYMPHOCYTES # BLD AUTO: 1.66 X10*3/UL (ref 1.2–4.8)
LYMPHOCYTES NFR BLD AUTO: 31.9 %
MCH RBC QN AUTO: 29.1 PG (ref 26–34)
MCHC RBC AUTO-ENTMCNC: 33.4 G/DL (ref 32–36)
MCV RBC AUTO: 87 FL (ref 80–100)
METHADONE UR QL SCN: NORMAL
MONOCYTES # BLD AUTO: 0.43 X10*3/UL (ref 0.1–1)
MONOCYTES NFR BLD AUTO: 8.3 %
NEUTROPHILS # BLD AUTO: 2.85 X10*3/UL (ref 1.2–7.7)
NEUTROPHILS NFR BLD AUTO: 54.8 %
NITRITE UR QL STRIP.AUTO: NEGATIVE
NRBC BLD-RTO: 0 /100 WBCS (ref 0–0)
OPIATES UR QL SCN: NORMAL
OXYCODONE+OXYMORPHONE UR QL SCN: NORMAL
P AXIS: 80 DEGREES
P OFFSET: 206 MS
P ONSET: 153 MS
PCP UR QL SCN: NORMAL
PH UR STRIP.AUTO: 6.5 [PH]
PLATELET # BLD AUTO: 242 X10*3/UL (ref 150–450)
POTASSIUM SERPL-SCNC: 4 MMOL/L (ref 3.5–5.3)
PR INTERVAL: 136 MS
PROT SERPL-MCNC: 7.2 G/DL (ref 6.4–8.2)
PROT UR STRIP.AUTO-MCNC: NEGATIVE MG/DL
Q ONSET: 221 MS
QRS COUNT: 16 BEATS
QRS DURATION: 82 MS
QT INTERVAL: 330 MS
QTC CALCULATION(BAZETT): 412 MS
QTC FREDERICIA: 383 MS
R AXIS: 48 DEGREES
RBC # BLD AUTO: 5.37 X10*6/UL (ref 4.5–5.9)
RBC # UR STRIP.AUTO: NEGATIVE MG/DL
SALICYLATES SERPL-MCNC: <3 MG/DL
SARS-COV-2 RNA RESP QL NAA+PROBE: NOT DETECTED
SODIUM SERPL-SCNC: 138 MMOL/L (ref 136–145)
SP GR UR STRIP.AUTO: 1
T AXIS: 58 DEGREES
T OFFSET: 386 MS
UROBILINOGEN UR STRIP.AUTO-MCNC: NORMAL MG/DL
VENTRICULAR RATE: 94 BPM
WBC # BLD AUTO: 5.2 X10*3/UL (ref 4.4–11.3)

## 2025-04-04 PROCEDURE — 71046 X-RAY EXAM CHEST 2 VIEWS: CPT

## 2025-04-04 PROCEDURE — 80053 COMPREHEN METABOLIC PANEL: CPT | Performed by: CLINICAL NURSE SPECIALIST

## 2025-04-04 PROCEDURE — 93005 ELECTROCARDIOGRAM TRACING: CPT

## 2025-04-04 PROCEDURE — 99285 EMERGENCY DEPT VISIT HI MDM: CPT | Mod: 25

## 2025-04-04 PROCEDURE — 36415 COLL VENOUS BLD VENIPUNCTURE: CPT | Performed by: CLINICAL NURSE SPECIALIST

## 2025-04-04 PROCEDURE — 81003 URINALYSIS AUTO W/O SCOPE: CPT | Performed by: CLINICAL NURSE SPECIALIST

## 2025-04-04 PROCEDURE — 80307 DRUG TEST PRSMV CHEM ANLYZR: CPT | Performed by: CLINICAL NURSE SPECIALIST

## 2025-04-04 PROCEDURE — 96360 HYDRATION IV INFUSION INIT: CPT

## 2025-04-04 PROCEDURE — 85025 COMPLETE CBC W/AUTO DIFF WBC: CPT | Performed by: CLINICAL NURSE SPECIALIST

## 2025-04-04 PROCEDURE — 71046 X-RAY EXAM CHEST 2 VIEWS: CPT | Mod: FOREIGN READ | Performed by: RADIOLOGY

## 2025-04-04 PROCEDURE — 80320 DRUG SCREEN QUANTALCOHOLS: CPT | Performed by: CLINICAL NURSE SPECIALIST

## 2025-04-04 PROCEDURE — 82550 ASSAY OF CK (CPK): CPT | Performed by: CLINICAL NURSE SPECIALIST

## 2025-04-04 PROCEDURE — 85300 ANTITHROMBIN III ACTIVITY: CPT | Performed by: CLINICAL NURSE SPECIALIST

## 2025-04-04 PROCEDURE — 2500000004 HC RX 250 GENERAL PHARMACY W/ HCPCS (ALT 636 FOR OP/ED): Performed by: CLINICAL NURSE SPECIALIST

## 2025-04-04 PROCEDURE — 87636 SARSCOV2 & INF A&B AMP PRB: CPT | Performed by: CLINICAL NURSE SPECIALIST

## 2025-04-04 PROCEDURE — 2500000001 HC RX 250 WO HCPCS SELF ADMINISTERED DRUGS (ALT 637 FOR MEDICARE OP): Performed by: CLINICAL NURSE SPECIALIST

## 2025-04-04 RX ORDER — ACETAMINOPHEN 325 MG/1
650 TABLET ORAL ONCE
Status: COMPLETED | OUTPATIENT
Start: 2025-04-04 | End: 2025-04-04

## 2025-04-04 RX ADMIN — SODIUM CHLORIDE 1000 ML: 9 INJECTION, SOLUTION INTRAVENOUS at 09:38

## 2025-04-04 RX ADMIN — ACETAMINOPHEN 650 MG: 325 TABLET, FILM COATED ORAL at 09:40

## 2025-04-04 ASSESSMENT — PAIN SCALES - GENERAL: PAINLEVEL_OUTOF10: 0 - NO PAIN

## 2025-04-04 ASSESSMENT — LIFESTYLE VARIABLES
TOTAL SCORE: 0
EVER FELT BAD OR GUILTY ABOUT YOUR DRINKING: NO
EVER HAD A DRINK FIRST THING IN THE MORNING TO STEADY YOUR NERVES TO GET RID OF A HANGOVER: NO
HAVE PEOPLE ANNOYED YOU BY CRITICIZING YOUR DRINKING: NO
HAVE YOU EVER FELT YOU SHOULD CUT DOWN ON YOUR DRINKING: NO

## 2025-04-04 ASSESSMENT — PAIN - FUNCTIONAL ASSESSMENT: PAIN_FUNCTIONAL_ASSESSMENT: 0-10

## 2025-04-04 ASSESSMENT — COLUMBIA-SUICIDE SEVERITY RATING SCALE - C-SSRS
1. IN THE PAST MONTH, HAVE YOU WISHED YOU WERE DEAD OR WISHED YOU COULD GO TO SLEEP AND NOT WAKE UP?: NO
6. HAVE YOU EVER DONE ANYTHING, STARTED TO DO ANYTHING, OR PREPARED TO DO ANYTHING TO END YOUR LIFE?: NO
2. HAVE YOU ACTUALLY HAD ANY THOUGHTS OF KILLING YOURSELF?: NO

## 2025-04-04 NOTE — LETTER
Cameron Agarwal was seen and treated in our emergency department on 4/4/2025.  He may return to work on 04/06/2025.  1-3 days if needed      If you have any questions or concerns, please don't hesitate to call.      Sherrie Carr, APRN-CNP

## 2025-04-04 NOTE — DISCHARGE INSTRUCTIONS
Make sure you are drinking plenty of fluids.  Follow-up with primary care physician in 2 days for reevaluation return with any worsening symptoms or concerns  Journal your blood pressure daily.  Take your medication as directed

## 2025-04-04 NOTE — LETTER
April 4, 2025    Patient: Cameron Agarwal   YOB: 1980   Date of Visit: 4/4/2025       To Whom It May Concern:    Cameron Agarwal was seen and treated in our emergency department on 4/4/2025. He {Return to school/sport/work:99806}.    If you have any questions or concerns, please don't hesitate to call.              CC: No Recipients

## 2025-04-04 NOTE — ED PROVIDER NOTES
Department of Emergency Medicine   ED  Provider Note  Admit Date/RoomTime: 4/4/2025  9:10 AM  ED Room: ST23/ST23        History of Present Illness:  Chief Complaint   Patient presents with    Muscle Pain     Pt states that he feels as though his muscle are sore and legs are tight. States he thinks he needs blood work to see if anything is wrong. Sees his PCP on this coming Wednesday         Cameron Agarwal is a 44 y.o. male history of hypertension presents emergency department complaints of not feeling well.  Patient reports he has felt like this for the past few days.  His symptoms come and go.  He denies any fever or chills.  No cough congestion runny nose sore throat no abdominal pain no nausea vomiting no diarrhea.  No rashes or sores.  No chest pain or shortness of breath complains of intermittent numbness and tingling.  Nothing at this time.  Would like to get blood work to make sure that his electrolytes are okay.  He has appoint with his doctor on Wednesday for reevaluation.  He does take blood pressure medication and has taken it this morning.  Was noted his blood pressure was elevated but took his blood pressure medicine prior to arrival.  Denies drug use.  Drinks alcohol occasionally.    Review of Systems:   Pertinent positives and negatives are stated within HPI, all other systems reviewed and are negative.        --------------------------------------------- PAST HISTORY ---------------------------------------------  Past Medical History:  has no past medical history on file.  Past Surgical History:  has no past surgical history on file.  Social History:  reports that he has been smoking cigarettes. He has never used smokeless tobacco. He reports current alcohol use. He reports that he does not use drugs.  Family History: family history is not on file.. Unless otherwise noted, family history is non contributory  The patient’s home medications have been reviewed.  Allergies: Patient has no known  allergies.        ---------------------------------------------------PHYSICAL EXAM--------------------------------------    GENERAL APPEARANCE: Awake and alert.   VITAL SIGNS: As per the nurses' triage record.  Elevated blood pressure, tachycardia  HEENT: Normocephalic, atraumatic. Extraocular muscles are intact. Pupils equal round and reactive to light. Conjunctiva are pink. Negative scleral icterus. Mucous membranes are moist. Tongue in the midline. Pharynx was without erythema or exudates, uvula midline  NECK: Soft Nontender and supple, full gross ROM, no meningeal signs.  CHEST: Nontender to palpation. Clear to auscultation bilaterally. No rales, rhonchi, or wheezing.   HEART: S1, S2.  Tachycardia regular rate and rhythm. No murmurs, gallops or rubs.  Strong and equal pulses in the extremities.   ABDOMEN: Soft, nontender, nondistended, positive bowel sounds, no palpable masses.  MUSCULCSKELETAL: The calves are nontender to palpation. Full gross active range of motion. Ambulating on own with no acute difficulties  NEUROLOGICAL: Awake, alert and oriented x 3. Power intact in the upper and lower extremities. Sensation is intact to light touch in the upper and lower extremities.   IMMUNOLOGICAL: No lymphatic streaking noted   DERM: No petechiae, rashes, or ecchymoses.          ------------------------- NURSING NOTES AND VITALS REVIEWED ---------------------------  The nursing notes within the ED encounter and vital signs as below have been reviewed by myself  /85 (BP Location: Left arm, Patient Position: Sitting)   Pulse 78   Temp 37.3 °C (99.1 °F) (Tympanic)   Resp 18   Ht 1.829 m (6')   Wt 77.5 kg (170 lb 12.8 oz)   SpO2 100%   BMI 23.16 kg/m²     Oxygen Saturation Interpretation: 100% room air          The patient’s available past medical records and past encounters were reviewed.          -----------------------DIAGNOSTIC RESULTS------------------------  LABS:    Labs Reviewed   COMPREHENSIVE  METABOLIC PANEL - Abnormal       Result Value    Glucose 116 (*)     Sodium 138      Potassium 4.0      Chloride 102      Bicarbonate 29      Anion Gap 11      Urea Nitrogen 6      Creatinine 0.85      eGFR >90      Calcium 9.9      Albumin 4.7      Alkaline Phosphatase 51      Total Protein 7.2      AST 27      Bilirubin, Total 0.3      ALT 24     URINALYSIS WITH REFLEX CULTURE AND MICROSCOPIC - Abnormal    Color, Urine Colorless (*)     Appearance, Urine Clear      Specific Gravity, Urine 1.005      pH, Urine 6.5      Protein, Urine NEGATIVE      Glucose, Urine Normal      Blood, Urine NEGATIVE      Ketones, Urine NEGATIVE      Bilirubin, Urine NEGATIVE      Urobilinogen, Urine Normal      Nitrite, Urine NEGATIVE      Leukocyte Esterase, Urine NEGATIVE     D-DIMER, NON VTE - Abnormal    D-Dimer Non VTE, Quant (mg/L FEU) <0.19 (*)     Narrative:     THROMBOEMBOLIC EVENTS CANNOT BE EXCLUDED SOLELY ON THE BASIS OF THE D-DIMER LEVEL BEING WITHIN THE NORMAL REFERENCE RANGE. D-DIMER LEVELS LESS THAN 0.5 MG/L FEU IN CONJUNCTION WITH A LOW CLINICAL PROBABILITY HAVE AN EXCELLENT NEGATIVE PREDICTIVE VALUE IN EXCLUDING A DIAGNOSIS OF PULMONARY EMBOLUS (PE) OR DEEP VEIN THROMBOSIS (DVT). ELEVATED D-DIMER LEVELS ARE NOT SPECIFIC TO PE OR DVT, AND MAY BE SEEN IN PATIENTS WITH DIC, ADVANCED AGE, PREGNANCY, MALIGNANCY, LIVER DISEASE, INFECTION, AND INFLAMMATORY CONDITIONS AMONG OTHERS. D-DIMER LEVELS MAY BE DECREASED IN PATIENTS RECEIVING ANTI-COAGULATION THERAPY.   DRUG SCREEN,URINE - Normal    Amphetamine Screen, Urine Presumptive Negative      Barbiturate Screen, Urine Presumptive Negative      Benzodiazepines Screen, Urine Presumptive Negative      Cannabinoid Screen, Urine Presumptive Negative      Cocaine Metabolite Screen, Urine Presumptive Negative      Fentanyl Screen, Urine Presumptive Negative      Opiate Screen, Urine Presumptive Negative      Oxycodone Screen, Urine Presumptive Negative      PCP Screen, Urine  Presumptive Negative      Methadone Screen, Urine Presumptive Negative      Narrative:     Drug screen results are presumptive and should not be used to assess   compliance with prescribed medication. Contact the performing [facility] laboratory   to add-on definitive confirmatory testing if clinically indicated.    Toxicology screening results are reported qualitatively. The concentration must   be greater than or equal to the cutoff to be reported as positive. The concentration   at which the screening test can detect an individual drug or metabolite varies.   The absence of expected drug(s) and/or drug metabolite(s) may indicate non-compliance,   inappropriate timing of specimen collection relative to drug administration, poor drug   absorption, diluted/adulterated urine, or limitations of testing. For medical purposes   only; not valid for forensic use.    Interpretive questions should be directed to the laboratory medical directors.   CREATINE KINASE - Normal    Creatine Kinase 267     ACUTE TOXICOLOGY PANEL, BLOOD - Normal    Acetaminophen <10.0      Salicylate  <3      Alcohol <10     SARS-COV-2 AND INFLUENZA A/B PCR - Normal    Flu A Result Not Detected      Flu B Result Not Detected      Coronavirus 2019, PCR Not Detected      Narrative:     This assay is an FDA-cleared, in vitro diagnostic nucleic acid amplification test for the qualitative detection and differentiation of SARS CoV-2/ Influenza A/B from nasopharyngeal specimens collected from individuals with signs and symptoms of respiratory tract infections, and has been validated for use at [facility]. Negative results do not preclude COVID-19/ Influenza A/B infections and should not be used as the sole basis for diagnosis, treatment, or other management decisions. Testing for SARS CoV-2 is recommended only for patients who meet current clinical and/or epidemiological criteria defined by federal, state, or local public health  directives.   CBC WITH AUTO DIFFERENTIAL    WBC 5.2      nRBC 0.0      RBC 5.37      Hemoglobin 15.6      Hematocrit 46.7      MCV 87      MCH 29.1      MCHC 33.4      RDW 14.1      Platelets 242      Neutrophils % 54.8      Immature Granulocytes %, Automated 0.2      Lymphocytes % 31.9      Monocytes % 8.3      Eosinophils % 4.2      Basophils % 0.6      Neutrophils Absolute 2.85      Immature Granulocytes Absolute, Automated 0.01      Lymphocytes Absolute 1.66      Monocytes Absolute 0.43      Eosinophils Absolute 0.22      Basophils Absolute 0.03     URINALYSIS WITH REFLEX CULTURE AND MICROSCOPIC    Narrative:     The following orders were created for panel order Urinalysis with Reflex Culture and Microscopic.  Procedure                               Abnormality         Status                     ---------                               -----------         ------                     Urinalysis with Reflex C...[[id number]]  Abnormal            Final result               Extra Urine Gray Tube[[id number]]                            In process                   Please view results for these tests on the individual orders.   EXTRA URINE GRAY TUBE       As interpreted by me, the above displayed labs are abnormal. All other labs obtained during this visit were within normal range or not returned as of this dictation.      EKG Interpretation    0935 EKG interpreted by myself independently, EKG shows normal sinus rhythm, rate of 94 bpm, AK interval 136, QRS 82, , QTc 412, patient has no ST elevation or depression, negative for acute MI. [ARAMIS]           XR chest 2 views   Final Result   No acute findings on two-view chest.   Signed by Jevon Armendariz MD              XR chest 2 views   Final Result   No acute findings on two-view chest.   Signed by Jevon Armendariz MD              ------------------------------ ED COURSE/MEDICAL DECISION MAKING----------------------  Medical Decision Making:   Exam: A medically appropriate  exam performed, outlined above, given the known history and presentation.    History obtained from: Review of medical record nursing notes patient      Social Determinants of Health considered during this visit: Takes care of himself at home    PAST MEDICAL HISTORY/Chronic Conditions Affecting Care     has no past medical history on file.       CC/HPI Summary, Social Determinants of health, Records Reviewed, DDx, testing done/not done, ED Course, Reassessment, disposition considerations/shared decision making with patient, consults, disposition:   Presents with feeling of unwell.  Intermittent numbness and tingling soreness in his arms and legs  Plan  Tylenol, normal saline, EKG, tox screen, CK, CBC, CMP, D-dimer, drug screen, COVID flu      Medical Decision Making/Differential Diagnosis:  Differentials include but not limited to viral illness versus electrolyte normality versus dehydration.  Patient is in no acute distress at this time.  No meningeal signs noted.  Well-nourished well-hydrated.  Review  Urine unremarkable  D-dimer unremarkable    Glucose 116  Electrolytes unremarkable  Normal renal function  Normal LFTs  Drug screen negative  COVID flu negative  White blood cell count 5.2  Hemoglobin 15.6  Patient presented to the emergency department with complaints of myalgias and not feeling well.  No acute respiratory distress no signs of sepsis or toxicity he is not hypotensive tachycardic or febrile.  Chest x-ray showed No acute findings on two-view chest..  EKG per attending note normal sinus rhythm no ST elevation or arrhythmia noted.  Electrolytes unremarkable.  Normal renal function normal LFTs.  No elevation white blood cell count to indicate infection patient is not anemic.  CK within normal limits.  Drug screen negative.  Urine is not consistent with UTI.  COVID flu negative.  D-dimer unremarkable  Patient is requesting a work slip.  Based on patient's clinical presentation history and symptoms  consistent with myalgias and dehydration.  Was initially found to be tachycardic improved with fluids.   elevated blood pressure did take his blood pressure prior to arrival.  Blood pressure improved.  Advised on supportive care measures at home patient already has a scheduled appointment on Wednesday with his primary care physician for reevaluation.  Patient seen independently attending physician available consultation if needed CMT for discharge  Discharge planning patient amenable to plan amenable to discharge  PROCEDURES  Unless otherwise noted below, none      CONSULTS:   None      ED Course as of 04/04/25 1437 Fri Apr 04, 2025   0935 EKG interpreted by myself independently, EKG shows normal sinus rhythm, rate of 94 bpm, IL interval 136, QRS 82, , QTc 412, patient has no ST elevation or depression, negative for acute MI. [ARAMIS]      ED Course User Index  [ARAMIS] Fawad Allan,          Diagnoses as of 04/04/25 1437   Myalgia   Dehydration         This patient has remained hemodynamically stable during their ED course.      Critical Care: None      Counseling:  The emergency provider has spoken with the patient and discussed today’s results, in addition to providing specific details for the plan of care and counseling regarding the diagnosis and prognosis.  Questions are answered at this time and they are agreeable with the plan.         --------------------------------- IMPRESSION AND DISPOSITION ---------------------------------    IMPRESSION  1. Myalgia    2. Dehydration        DISPOSITION  Disposition: Discharge home  Patient condition is stable improved        NOTE: This report was transcribed using voice recognition software. Every effort was made to ensure accuracy; however, inadvertent computerized transcription errors may be present      Sherrie Carr, DEISY-CNP  04/04/25 1437

## 2025-04-09 ENCOUNTER — APPOINTMENT (OUTPATIENT)
Dept: PRIMARY CARE | Facility: CLINIC | Age: 45
End: 2025-04-09

## 2025-04-09 VITALS
WEIGHT: 166.2 LBS | HEART RATE: 94 BPM | HEIGHT: 72 IN | TEMPERATURE: 98.5 F | SYSTOLIC BLOOD PRESSURE: 119 MMHG | DIASTOLIC BLOOD PRESSURE: 81 MMHG | BODY MASS INDEX: 22.51 KG/M2

## 2025-04-09 DIAGNOSIS — Z12.5 SCREENING FOR PROSTATE CANCER: ICD-10-CM

## 2025-04-09 DIAGNOSIS — I10 HYPERTENSION, UNSPECIFIED TYPE: ICD-10-CM

## 2025-04-09 DIAGNOSIS — I10 PRIMARY HYPERTENSION: ICD-10-CM

## 2025-04-09 DIAGNOSIS — R73.02 IGT (IMPAIRED GLUCOSE TOLERANCE): ICD-10-CM

## 2025-04-09 DIAGNOSIS — Z13.6 SCREENING FOR CARDIOVASCULAR CONDITION: ICD-10-CM

## 2025-04-09 DIAGNOSIS — Z72.0 TOBACCO USE: ICD-10-CM

## 2025-04-09 DIAGNOSIS — Z12.11 ENCOUNTER FOR SCREENING FOR MALIGNANT NEOPLASM OF COLON: ICD-10-CM

## 2025-04-09 DIAGNOSIS — Z00.00 ANNUAL PHYSICAL EXAM: Primary | ICD-10-CM

## 2025-04-09 PROCEDURE — 3008F BODY MASS INDEX DOCD: CPT | Performed by: INTERNAL MEDICINE

## 2025-04-09 PROCEDURE — 3079F DIAST BP 80-89 MM HG: CPT | Performed by: INTERNAL MEDICINE

## 2025-04-09 PROCEDURE — 99396 PREV VISIT EST AGE 40-64: CPT | Performed by: INTERNAL MEDICINE

## 2025-04-09 PROCEDURE — 4004F PT TOBACCO SCREEN RCVD TLK: CPT | Performed by: INTERNAL MEDICINE

## 2025-04-09 PROCEDURE — 3074F SYST BP LT 130 MM HG: CPT | Performed by: INTERNAL MEDICINE

## 2025-04-09 RX ORDER — ACETAMINOPHEN 500 MG
2000 TABLET ORAL DAILY
Qty: 90 CAPSULE | Refills: 3 | Status: SHIPPED | OUTPATIENT
Start: 2025-04-09 | End: 2025-04-11 | Stop reason: SDUPTHER

## 2025-04-09 RX ORDER — LOSARTAN POTASSIUM 100 MG/1
100 TABLET ORAL DAILY
Qty: 90 TABLET | Refills: 3 | Status: SHIPPED | OUTPATIENT
Start: 2025-04-09 | End: 2025-04-11 | Stop reason: SDUPTHER

## 2025-04-09 RX ORDER — MULTIVITAMIN
1 TABLET ORAL DAILY
Qty: 90 TABLET | Refills: 3 | Status: SHIPPED | OUTPATIENT
Start: 2025-04-09 | End: 2025-04-11 | Stop reason: SDUPTHER

## 2025-04-09 ASSESSMENT — ENCOUNTER SYMPTOMS
FEVER: 0
SHORTNESS OF BREATH: 0
CHILLS: 0
PALPITATIONS: 0
COUGH: 0
POLYDIPSIA: 0

## 2025-04-09 ASSESSMENT — PAIN SCALES - GENERAL: PAINLEVEL_OUTOF10: 0-NO PAIN

## 2025-04-09 NOTE — PROGRESS NOTES
Subjective   Patient ID: Cameron Agarwal is a 44 y.o. male who presents for Annual Exam (CPE).    Patient presents today for an annual wellness exam    Medical history and surgical history updated today  Medication list reconciled and updated  Patient denies vision issues at this time  Patient denies hearing issues at this time  Current diet: low sugars.   Current exercise habit: works in active job, but no recreational exercise.   Follows with a dentist: Yes    Patient counseled about available preventative health vaccinations and provided with opportunity to update them with our office or through prescription to preferred pharmacy.    Reviewed and discussed preventative health screening recommendations for colon cancer:     Reviewed and discussed preventative health recommendations for screening for prostate cancer: ordered.  Ordered         Review of Systems   Constitutional:  Negative for chills and fever.   Respiratory:  Negative for cough and shortness of breath.    Cardiovascular:  Negative for chest pain and palpitations.   Endocrine: Negative for polydipsia and polyuria.       Objective   /81   Pulse 94   Temp 36.9 °C (98.5 °F) (Temporal)   Ht 1.829 m (6')   Wt 75.4 kg (166 lb 3.2 oz)   BMI 22.54 kg/m²     Physical Exam  Constitutional:       Appearance: Normal appearance.   HENT:      Head: Normocephalic and atraumatic.      Right Ear: Tympanic membrane normal.      Left Ear: Tympanic membrane normal.      Nose: Nose normal.      Mouth/Throat:      Mouth: Mucous membranes are moist.   Eyes:      Extraocular Movements: Extraocular movements intact.      Conjunctiva/sclera: Conjunctivae normal.      Pupils: Pupils are equal, round, and reactive to light.   Neck:      Thyroid: No thyroid mass, thyromegaly or thyroid tenderness.      Vascular: No carotid bruit.   Cardiovascular:      Rate and Rhythm: Normal rate and regular rhythm.      Heart sounds: No murmur heard.     No friction rub. No gallop.    Pulmonary:      Effort: Pulmonary effort is normal. No respiratory distress.      Breath sounds: No wheezing, rhonchi or rales.   Abdominal:      General: Bowel sounds are normal.      Palpations: Abdomen is soft.      Tenderness: There is no abdominal tenderness. There is no guarding.      Hernia: No hernia is present.   Musculoskeletal:      Cervical back: Neck supple. No tenderness.      Right lower leg: No edema.      Left lower leg: No edema.   Lymphadenopathy:      Cervical: No cervical adenopathy.   Skin:     Coloration: Skin is not jaundiced.   Neurological:      General: No focal deficit present.      Mental Status: He is alert and oriented to person, place, and time.   Psychiatric:         Mood and Affect: Mood normal.       Assessment/Plan   Assessment & Plan  Annual physical exam    Orders:  •  Lipid Panel; Future  •  Hemoglobin A1C; Future  •  Prostate Specific Antigen, Screen; Future  •  multivitamin tablet; Take 1 tablet by mouth once daily.  •  cholecalciferol (Vitamin D3) 50 mcg (2,000 unit) capsule; Take 1 capsule (50 mcg) by mouth once daily.    IGT (impaired glucose tolerance)    Orders:  •  Lipid Panel; Future  •  Hemoglobin A1C; Future  •  Prostate Specific Antigen, Screen; Future    Primary hypertension    Orders:  •  Lipid Panel; Future  •  Hemoglobin A1C; Future  •  Prostate Specific Antigen, Screen; Future    Screening for prostate cancer    Orders:  •  Lipid Panel; Future  •  Hemoglobin A1C; Future  •  Prostate Specific Antigen, Screen; Future    Screening for cardiovascular condition    Orders:  •  Lipid Panel; Future  •  Hemoglobin A1C; Future  •  Prostate Specific Antigen, Screen; Future    Tobacco use         Encounter for screening for malignant neoplasm of colon    Orders:  •  Colonoscopy Screening; Average Risk Patient; Future    Hypertension, unspecified type    Orders:  •  losartan (Cozaar) 100 mg tablet; Take 1 tablet (100 mg) by mouth once daily.

## 2025-04-09 NOTE — ASSESSMENT & PLAN NOTE
Orders:    Lipid Panel; Future    Hemoglobin A1C; Future    Prostate Specific Antigen, Screen; Future

## 2025-04-09 NOTE — LETTER
April 11, 2025     Cameron Agarwal  [address]  [facility] [address]      Dear Mr. Agarwal:    Below are the results from your recent visit:    Mr. Agarwal, thank you for completing your blood panel I have some news about your sugar average.  Historically, we have identified you as somebody who was insulin resistant and at risk for developing diabetes.  Since the last time we have checked you there has been a mild increase in your blood sugar average but this has resulted in you moving right to the edge of qualifying as being diagnosed with diabetes.  Honestly, it is not a dramatic change from your prior sugar because you are already in the insulin resistant range but it is enough of a change to change the way we approach this or think about it.  You do not need to make any significant changes with new medication but we should sincerely work on education to prevent progression and manage this through diet and lifestyle as efficiently as possible.  As you review this consider my offer here of referring you to our diabetic educators for further counseling information and dietary planning to control the sugar as best as possible while also limiting medication impact.  If you would like me to place that referral let me know and I will be happy to do so.   Your other testing however was excellent with your cholesterol profile and prostate levels being normal     Resulted Orders   Lipid Panel   Result Value Ref Range    CHOLESTEROL, TOTAL 245 (H) <200 mg/dL    HDL CHOLESTEROL 98 > OR = 40 mg/dL    TRIGLYCERIDES 89 <150 mg/dL    LDL-CHOLESTEROL 128 (H) mg/dL (calc)      Comment:      Reference range: <100     Desirable range <100 mg/dL for primary prevention;    <70 mg/dL for patients with CHD or diabetic patients   with > or = 2 CHD risk factors.     LDL-C is now calculated using the Filipe-Ayala   calculation, which is a validated novel method providing   better accuracy than the Friedewald  equation in the   estimation of LDL-C.   Filipe SS et al. HERMES. 2013;310(19): 1185-2991   (http://education.[employer]/faq/CKY443)      CHOL/HDLC RATIO 2.5 <5.0 (calc)    NON HDL CHOLESTEROL 147 (H) <130 mg/dL (calc)      Comment:      For patients with diabetes plus 1 major ASCVD risk   factor, treating to a non-HDL-C goal of <100 mg/dL   (LDL-C of <70 mg/dL) is considered a therapeutic   option.      Narrative    FASTING:YES    FASTING: YES   Hemoglobin A1C   Result Value Ref Range    HEMOGLOBIN A1c 6.5 (H) <5.7 % of total Hgb      Comment:      For someone without known diabetes, a hemoglobin A1c  value of 6.5% or greater indicates that they may have   diabetes and this should be confirmed with a follow-up   test.     For someone with known diabetes, a value <7% indicates   that their diabetes is well controlled and a value   greater than or equal to 7% indicates suboptimal   control. A1c targets should be individualized based on   duration of diabetes, age, comorbid conditions, and   other considerations.     Currently, no consensus exists regarding use of  hemoglobin A1c for diagnosis of diabetes for children.          eAG (mg/dL) 140 mg/dL    eAG (mmol/L) 7.7 mmol/L    Narrative    FASTING:YES    FASTING: YES   Prostate Specific Antigen, Screen   Result Value Ref Range    PSA, TOTAL 0.86 < OR = 4.00 ng/mL      Comment:      The total PSA value from this assay system is   standardized against the WHO standard. The test   result will be approximately 20% lower when compared   to the equimolar-standardized total PSA (Dariusz   Igor). Comparison of serial PSA results should be   interpreted with this fact in mind.     This test was performed using the Siemens   chemiluminescent method. Values obtained from   different assay methods cannot be used  interchangeably. PSA levels, regardless of  value, should not be interpreted as absolute  evidence of the presence or absence of disease.      Narrative     FASTING:YES    FASTING: YES     If you have any questions or concerns, please don't hesitate to call.         Sincerely,        Xu Bethea MD

## 2025-04-09 NOTE — LETTER
April 9, 2025     Patient: Cameron Agarwal   YOB: 1980   Date of Visit: 4/9/2025       To Whom It May Concern:    Cameron Agarwal was seen in my clinic on 4/9/2025 at 8:00 am. Please excuse Cameron for his absence from work on this day to make the appointment.    He was also recently out of work for 2 days due to a contagious illness, please excuse for these absences as it was medically required to prevent spread and manage his illness.     If you have any questions or concerns, please don't hesitate to call.         Sincerely,         Xu Bethea MD        CC: No Recipients

## 2025-04-10 LAB
ATRIAL RATE: 94 BPM
CHOLEST SERPL-MCNC: 245 MG/DL
CHOLEST/HDLC SERPL: 2.5 (CALC)
EST. AVERAGE GLUCOSE BLD GHB EST-MCNC: 140 MG/DL
EST. AVERAGE GLUCOSE BLD GHB EST-SCNC: 7.7 MMOL/L
HBA1C MFR BLD: 6.5 % OF TOTAL HGB
HDLC SERPL-MCNC: 98 MG/DL
LDLC SERPL CALC-MCNC: 128 MG/DL (CALC)
NONHDLC SERPL-MCNC: 147 MG/DL (CALC)
P AXIS: 80 DEGREES
P OFFSET: 206 MS
P ONSET: 153 MS
PR INTERVAL: 136 MS
PSA SERPL-MCNC: 0.86 NG/ML
Q ONSET: 221 MS
QRS COUNT: 16 BEATS
QRS DURATION: 82 MS
QT INTERVAL: 330 MS
QTC CALCULATION(BAZETT): 412 MS
QTC FREDERICIA: 383 MS
R AXIS: 48 DEGREES
T AXIS: 58 DEGREES
T OFFSET: 386 MS
TRIGL SERPL-MCNC: 89 MG/DL
VENTRICULAR RATE: 94 BPM

## 2025-04-11 DIAGNOSIS — I10 HYPERTENSION, UNSPECIFIED TYPE: ICD-10-CM

## 2025-04-11 DIAGNOSIS — Z00.00 ANNUAL PHYSICAL EXAM: ICD-10-CM

## 2025-04-11 RX ORDER — LOSARTAN POTASSIUM 100 MG/1
100 TABLET ORAL DAILY
Qty: 90 TABLET | Refills: 3 | Status: SHIPPED | OUTPATIENT
Start: 2025-04-11 | End: 2026-04-11

## 2025-04-11 RX ORDER — ACETAMINOPHEN 500 MG
50 TABLET ORAL DAILY
Qty: 90 CAPSULE | Refills: 3 | Status: SHIPPED | OUTPATIENT
Start: 2025-04-11 | End: 2026-04-11

## 2025-04-11 RX ORDER — MULTIVITAMIN
1 TABLET ORAL DAILY
Qty: 90 TABLET | Refills: 3 | Status: SHIPPED | OUTPATIENT
Start: 2025-04-11 | End: 2026-04-11

## 2025-04-13 ENCOUNTER — HOSPITAL ENCOUNTER (EMERGENCY)
Facility: HOSPITAL | Age: 45
Discharge: HOME | End: 2025-04-13
Attending: STUDENT IN AN ORGANIZED HEALTH CARE EDUCATION/TRAINING PROGRAM
Payer: COMMERCIAL

## 2025-04-13 VITALS
WEIGHT: 170.5 LBS | HEART RATE: 78 BPM | BODY MASS INDEX: 23.09 KG/M2 | OXYGEN SATURATION: 100 % | TEMPERATURE: 97.9 F | DIASTOLIC BLOOD PRESSURE: 65 MMHG | HEIGHT: 72 IN | SYSTOLIC BLOOD PRESSURE: 118 MMHG | RESPIRATION RATE: 16 BRPM

## 2025-04-13 DIAGNOSIS — M79.10 MYALGIA: Primary | ICD-10-CM

## 2025-04-13 LAB
ALBUMIN SERPL BCP-MCNC: 4.3 G/DL (ref 3.4–5)
ALP SERPL-CCNC: 51 U/L (ref 33–120)
ALT SERPL W P-5'-P-CCNC: 23 U/L (ref 10–52)
ANION GAP SERPL CALCULATED.3IONS-SCNC: 9 MMOL/L (ref 10–20)
APPEARANCE UR: CLEAR
AST SERPL W P-5'-P-CCNC: 23 U/L (ref 9–39)
BASOPHILS # BLD AUTO: 0.03 X10*3/UL (ref 0–0.1)
BASOPHILS NFR BLD AUTO: 0.6 %
BILIRUB SERPL-MCNC: 0.4 MG/DL (ref 0–1.2)
BILIRUB UR STRIP.AUTO-MCNC: NEGATIVE MG/DL
BUN SERPL-MCNC: 7 MG/DL (ref 6–23)
CALCIUM SERPL-MCNC: 9.1 MG/DL (ref 8.6–10.3)
CHLORIDE SERPL-SCNC: 105 MMOL/L (ref 98–107)
CO2 SERPL-SCNC: 28 MMOL/L (ref 21–32)
COLOR UR: NORMAL
CREAT SERPL-MCNC: 0.93 MG/DL (ref 0.5–1.3)
EGFRCR SERPLBLD CKD-EPI 2021: >90 ML/MIN/1.73M*2
EOSINOPHIL # BLD AUTO: 0.21 X10*3/UL (ref 0–0.7)
EOSINOPHIL NFR BLD AUTO: 3.9 %
ERYTHROCYTE [DISTWIDTH] IN BLOOD BY AUTOMATED COUNT: 14.4 % (ref 11.5–14.5)
GLUCOSE SERPL-MCNC: 100 MG/DL (ref 74–99)
GLUCOSE UR STRIP.AUTO-MCNC: NORMAL MG/DL
HCT VFR BLD AUTO: 46.4 % (ref 41–52)
HGB BLD-MCNC: 15.5 G/DL (ref 13.5–17.5)
IMM GRANULOCYTES # BLD AUTO: 0.01 X10*3/UL (ref 0–0.7)
IMM GRANULOCYTES NFR BLD AUTO: 0.2 % (ref 0–0.9)
KETONES UR STRIP.AUTO-MCNC: NEGATIVE MG/DL
LEUKOCYTE ESTERASE UR QL STRIP.AUTO: NEGATIVE
LIPASE SERPL-CCNC: 27 U/L (ref 9–82)
LYMPHOCYTES # BLD AUTO: 1.86 X10*3/UL (ref 1.2–4.8)
LYMPHOCYTES NFR BLD AUTO: 34.4 %
MCH RBC QN AUTO: 29.1 PG (ref 26–34)
MCHC RBC AUTO-ENTMCNC: 33.4 G/DL (ref 32–36)
MCV RBC AUTO: 87 FL (ref 80–100)
MONOCYTES # BLD AUTO: 0.47 X10*3/UL (ref 0.1–1)
MONOCYTES NFR BLD AUTO: 8.7 %
NEUTROPHILS # BLD AUTO: 2.82 X10*3/UL (ref 1.2–7.7)
NEUTROPHILS NFR BLD AUTO: 52.2 %
NITRITE UR QL STRIP.AUTO: NEGATIVE
NRBC BLD-RTO: 0 /100 WBCS (ref 0–0)
PH UR STRIP.AUTO: 7 [PH]
PLATELET # BLD AUTO: 248 X10*3/UL (ref 150–450)
POTASSIUM SERPL-SCNC: 4.1 MMOL/L (ref 3.5–5.3)
PROT SERPL-MCNC: 6.9 G/DL (ref 6.4–8.2)
PROT UR STRIP.AUTO-MCNC: NEGATIVE MG/DL
RBC # BLD AUTO: 5.32 X10*6/UL (ref 4.5–5.9)
RBC # UR STRIP.AUTO: NEGATIVE MG/DL
SODIUM SERPL-SCNC: 138 MMOL/L (ref 136–145)
SP GR UR STRIP.AUTO: 1.02
UROBILINOGEN UR STRIP.AUTO-MCNC: NORMAL MG/DL
WBC # BLD AUTO: 5.4 X10*3/UL (ref 4.4–11.3)

## 2025-04-13 PROCEDURE — 85025 COMPLETE CBC W/AUTO DIFF WBC: CPT | Performed by: PHYSICIAN ASSISTANT

## 2025-04-13 PROCEDURE — 36415 COLL VENOUS BLD VENIPUNCTURE: CPT | Performed by: PHYSICIAN ASSISTANT

## 2025-04-13 PROCEDURE — 81003 URINALYSIS AUTO W/O SCOPE: CPT | Performed by: PHYSICIAN ASSISTANT

## 2025-04-13 PROCEDURE — 96360 HYDRATION IV INFUSION INIT: CPT

## 2025-04-13 PROCEDURE — 99284 EMERGENCY DEPT VISIT MOD MDM: CPT | Mod: 25 | Performed by: STUDENT IN AN ORGANIZED HEALTH CARE EDUCATION/TRAINING PROGRAM

## 2025-04-13 PROCEDURE — 83690 ASSAY OF LIPASE: CPT | Performed by: PHYSICIAN ASSISTANT

## 2025-04-13 PROCEDURE — 2500000004 HC RX 250 GENERAL PHARMACY W/ HCPCS (ALT 636 FOR OP/ED): Performed by: PHYSICIAN ASSISTANT

## 2025-04-13 PROCEDURE — 80053 COMPREHEN METABOLIC PANEL: CPT | Performed by: PHYSICIAN ASSISTANT

## 2025-04-13 RX ADMIN — SODIUM CHLORIDE 1000 ML: 9 INJECTION, SOLUTION INTRAVENOUS at 09:47

## 2025-04-13 ASSESSMENT — PAIN SCALES - GENERAL
PAINLEVEL_OUTOF10: 0 - NO PAIN
PAINLEVEL_OUTOF10: 0 - NO PAIN

## 2025-04-13 ASSESSMENT — PAIN - FUNCTIONAL ASSESSMENT
PAIN_FUNCTIONAL_ASSESSMENT: 0-10
PAIN_FUNCTIONAL_ASSESSMENT: 0-10

## 2025-04-13 ASSESSMENT — LIFESTYLE VARIABLES
EVER FELT BAD OR GUILTY ABOUT YOUR DRINKING: NO
HAVE YOU EVER FELT YOU SHOULD CUT DOWN ON YOUR DRINKING: NO
HAVE PEOPLE ANNOYED YOU BY CRITICIZING YOUR DRINKING: NO
EVER HAD A DRINK FIRST THING IN THE MORNING TO STEADY YOUR NERVES TO GET RID OF A HANGOVER: NO
TOTAL SCORE: 0

## 2025-04-13 ASSESSMENT — COLUMBIA-SUICIDE SEVERITY RATING SCALE - C-SSRS
1. IN THE PAST MONTH, HAVE YOU WISHED YOU WERE DEAD OR WISHED YOU COULD GO TO SLEEP AND NOT WAKE UP?: NO
2. HAVE YOU ACTUALLY HAD ANY THOUGHTS OF KILLING YOURSELF?: NO
6. HAVE YOU EVER DONE ANYTHING, STARTED TO DO ANYTHING, OR PREPARED TO DO ANYTHING TO END YOUR LIFE?: NO

## 2025-04-13 NOTE — ED TRIAGE NOTES
"\"Dehydration\", feels real dry; stated he has Influenza.  Now no pain, just feels \"dehydrated\" since yesterday.  "

## 2025-04-13 NOTE — DISCHARGE INSTRUCTIONS
Your laboratory evaluation was reassuring.  There is no evidence of dehydration based off of your labs.  Please continue oral hydration with water and electrolytes.  Follow-up with your primary care physician.

## 2025-04-13 NOTE — ED NOTES
Pt requesting work note to be off tomorrow. Work up was negative. Informed pt he is OK to work tomorrow per provider.      Ally Jonas RN  04/13/25 6468

## 2025-04-13 NOTE — ED PROVIDER NOTES
"HPI   Chief Complaint   Patient presents with    Dehydration     \"Dehydration\", feels real dry; stated he has Influenza.  Now no pain, just feels \"dehydrated\" since yesterday.       HPI  This is a 44-year-old male presenting to the emergency department for evaluation of possible \"dehydration.\".  Patient states that he feels like his mouth is dry and that he feels dehydrated.  He states that about a week ago he had what he thought was influenza however he is completely recovered from that.  He is not having any body aches, fever, chills, nasal congestion, sore throat, coughing.  No recent abdominal pain, nausea, vomiting or diarrhea.  He is eating and drinking normally.  He states he is actually been drinking a lot of water and Gatorade.  He presents to the emergency department for evaluation of dehydration and possible need for IV fluids.    Please see HPI for pertinent positive and negative ROS.       Patient History   No past medical history on file.  No past surgical history on file.  Family History   Problem Relation Name Age of Onset    Colon cancer Neg Hx       Social History     Tobacco Use    Smoking status: Every Day     Current packs/day: 0.25     Types: Cigarettes    Smokeless tobacco: Never    Tobacco comments:     Working to stop. Down to under 1/4 ppd   Vaping Use    Vaping status: Never Used   Substance Use Topics    Alcohol use: Yes     Comment: OCASSIONAL    Drug use: Never       Physical Exam   ED Triage Vitals [04/13/25 0927]   Temperature Heart Rate Respirations BP   36.6 °C (97.9 °F) 76 20 123/80      Pulse Ox Temp Source Heart Rate Source Patient Position   100 % Temporal Monitor Sitting      BP Location FiO2 (%)     Right arm --       Physical Exam  GENERAL APPEARANCE: Awake and alert. No acute respiratory distress.   VITAL SIGNS: As per the nurses' triage record.  HEENT: Normocephalic, atraumatic. Extraocular muscles are intact. Conjunctiva are pink. Negative scleral icterus. Mucous membranes " are moist. Tongue in the midline. Oropharynx clear, uvula midline.   NECK: Soft, nontender and supple, full gross ROM, no meningeal signs.  CHEST: Nontender to palpation. Clear to auscultation bilaterally. No rales, rhonchi, or wheezing. Symmetric rise and fall of chest wall.   HEART: Clear S1 and S2. Regular rate and rhythm.  Strong and equal pulses in the extremities.  ABDOMEN: Soft, nontender, nondistended, positive bowel sounds, no palpable masses.  MUSCULOSKELETAL: Full gross active range of motion. Ambulating on own with no acute difficulties  NEUROLOGICAL: Awake, alert and oriented x 3. Motor power intact in the upper and lower extremities. Sensation is intact to light touch in the upper and lower extremities. Patient answering questions appropriately.   IMMUNOLOGICAL: No lymphatic streaking noted  DERMATOLOGIC: Warm and dry without petechiae, rashes, or ecchymosis noted on visible skin.   PYSCH: Cooperative with appropriate mood and affect.    ED Course & MDM   Diagnoses as of 04/13/25 1058   Myalgia                 No data recorded     Davion Coma Scale Score: 15 (04/13/25 0935 : Juanita Villalobos RN)                           Medical Decision Making  Parts of this chart have been completed using voice recognition software. Please excuse any errors of transcription.  My thought process and reason for plan has been formulated from the time that I saw the patient until the time of disposition and is not specific to one specific moment during their visit and furthermore my MDM encompasses this entire chart and not only this text box.      HPI: Detailed above.    Exam: A medically appropriate exam performed, outlined above, given the known history and presentation.    History obtained from: Patient      Medications given during visit:  Medications   sodium chloride 0.9 % bolus 1,000 mL (1,000 mL intravenous New Bag 4/13/25 4389)        Diagnostic/tests  Labs Reviewed   COMPREHENSIVE METABOLIC PANEL - Abnormal        Result Value    Glucose 100 (*)     Sodium 138      Potassium 4.1      Chloride 105      Bicarbonate 28      Anion Gap 9 (*)     Urea Nitrogen 7      Creatinine 0.93      eGFR >90      Calcium 9.1      Albumin 4.3      Alkaline Phosphatase 51      Total Protein 6.9      AST 23      Bilirubin, Total 0.4      ALT 23     URINALYSIS WITH REFLEX MICROSCOPIC - Normal    Color, Urine Light-Yellow      Appearance, Urine Clear      Specific Gravity, Urine 1.016      pH, Urine 7.0      Protein, Urine NEGATIVE      Glucose, Urine Normal      Blood, Urine NEGATIVE      Ketones, Urine NEGATIVE      Bilirubin, Urine NEGATIVE      Urobilinogen, Urine Normal      Nitrite, Urine NEGATIVE      Leukocyte Esterase, Urine NEGATIVE     LIPASE - Normal    Lipase 27      Narrative:     Venipuncture immediately after or during the administration of Metamizole may lead to falsely low results. Testing should be performed immediately prior to Metamizole dosing.   CBC WITH AUTO DIFFERENTIAL    WBC 5.4      nRBC 0.0      RBC 5.32      Hemoglobin 15.5      Hematocrit 46.4      MCV 87      MCH 29.1      MCHC 33.4      RDW 14.4      Platelets 248      Neutrophils % 52.2      Immature Granulocytes %, Automated 0.2      Lymphocytes % 34.4      Monocytes % 8.7      Eosinophils % 3.9      Basophils % 0.6      Neutrophils Absolute 2.82      Immature Granulocytes Absolute, Automated 0.01      Lymphocytes Absolute 1.86      Monocytes Absolute 0.47      Eosinophils Absolute 0.21      Basophils Absolute 0.03        No orders to display        Considerations/further MDM:  Patient was seen in conjucntion with my supervising physician,  Dr. Gregg. Please refer to his note.    Differential diagnosis includes was not limited to electrolyte disturbance versus MAREK     Laboratory evaluation is grossly unremarkable.  CMP and CBC showed no acute findings.  Lipase within normal limits.  Urinalysis shows no evidence of UTI or protein.  Patient was given 1 L IV  normal saline.  His vital signs remained stable in the emergency department.  At this time patient was discharged with instructions to continue oral hydration and follow-up with his primary care physician.  Return precautions were discussed.  He verbalized understanding felt comfortable to plan home.  He was discharged in stable condition.    Procedure  Procedures     Freda Snell PA-C  04/16/25 1454

## 2025-04-14 NOTE — ED PROVIDER NOTES
This is a 44-year-old male who presents to the ED for evaluation of feeling dehydrated.  He states that he just feels dry and states that he just got over an influenza infection.  Last week he had nasal congestion, body aches and chills and his PCP told him that he likely had influenza.  He states multiple coworkers had recently with similar symptoms.  He states he feels he is drinking enough water and Gatorade at home but he has a dry mouth and just feels generally dehydrated which is why he came to the ED today requesting IV hydration.  He denies any chest pain or shortness of breath, cough or congestion, any vomiting or diarrhea recently.  He otherwise is in his usual state of health.    Physical exam was completely unremarkable.  He has no abdominal tenderness, normal cardiopulmonary exam.  He is well-appearing otherwise.  Lab work shows no metabolic derangement.  No UTI.  No anemia.  He was given a liter of IV fluids and discharged from the ED.    Physical Exam  General: well developed, well nourished 44-year-old male who is awake and alert, in no apparent distress  Eyes: sclera clear bilaterally, PERRL, EOMI  HENT: normocephalic, atraumatic. Pharynx without erythema or exudates, uvula midline.  CV: regular rate and rhythm, no murmur, no gallops, or rubs.   Resp: clear to ascultation bilaterally, no wheezes, rales, or rhonchi  GI: abdomen soft, nontender without rigidity or guarding, no peritoneal signs, abdomen is nondistended, no masses palpated  Psych: appropriate mood and affect, cooperative with exam  Skin: warm, dry, without evidence of rash or abrasions    Diagnoses as of 04/14/25 1612   Myalgia     Labs Reviewed   COMPREHENSIVE METABOLIC PANEL - Abnormal       Result Value    Glucose 100 (*)     Sodium 138      Potassium 4.1      Chloride 105      Bicarbonate 28      Anion Gap 9 (*)     Urea Nitrogen 7      Creatinine 0.93      eGFR >90      Calcium 9.1      Albumin 4.3      Alkaline Phosphatase 51       Total Protein 6.9      AST 23      Bilirubin, Total 0.4      ALT 23     URINALYSIS WITH REFLEX MICROSCOPIC - Normal    Color, Urine Light-Yellow      Appearance, Urine Clear      Specific Gravity, Urine 1.016      pH, Urine 7.0      Protein, Urine NEGATIVE      Glucose, Urine Normal      Blood, Urine NEGATIVE      Ketones, Urine NEGATIVE      Bilirubin, Urine NEGATIVE      Urobilinogen, Urine Normal      Nitrite, Urine NEGATIVE      Leukocyte Esterase, Urine NEGATIVE     LIPASE - Normal    Lipase 27      Narrative:     Venipuncture immediately after or during the administration of Metamizole may lead to falsely low results. Testing should be performed immediately prior to Metamizole dosing.   CBC WITH AUTO DIFFERENTIAL    WBC 5.4      nRBC 0.0      RBC 5.32      Hemoglobin 15.5      Hematocrit 46.4      MCV 87      MCH 29.1      MCHC 33.4      RDW 14.4      Platelets 248      Neutrophils % 52.2      Immature Granulocytes %, Automated 0.2      Lymphocytes % 34.4      Monocytes % 8.7      Eosinophils % 3.9      Basophils % 0.6      Neutrophils Absolute 2.82      Immature Granulocytes Absolute, Automated 0.01      Lymphocytes Absolute 1.86      Monocytes Absolute 0.47      Eosinophils Absolute 0.21      Basophils Absolute 0.03            Partha Gregg DO  04/14/25 5170

## 2025-04-23 ENCOUNTER — TELEPHONE (OUTPATIENT)
Dept: PRIMARY CARE | Facility: CLINIC | Age: 45
End: 2025-04-23
Payer: COMMERCIAL

## 2025-04-23 DIAGNOSIS — R73.02 IGT (IMPAIRED GLUCOSE TOLERANCE): Primary | ICD-10-CM

## 2025-04-23 NOTE — TELEPHONE ENCOUNTER
Patient would like to get a referral you requested and would like to know when you want to see him again?

## 2025-04-23 NOTE — TELEPHONE ENCOUNTER
I do not know what referral he is referring to aside from the colonoscopy he is due for this year, the general scheduling number is listed on his discharge paperwork from the day of our visit or you can provide him with the number again.  As long as his results come back normal then he is good for yearly visits because he is not using any high risk meds.  He is welcome to see me sooner on an as-needed basis.

## 2025-04-27 ENCOUNTER — HOSPITAL ENCOUNTER (EMERGENCY)
Facility: HOSPITAL | Age: 45
Discharge: HOME | End: 2025-04-27
Payer: COMMERCIAL

## 2025-04-27 VITALS
DIASTOLIC BLOOD PRESSURE: 82 MMHG | RESPIRATION RATE: 16 BRPM | BODY MASS INDEX: 23.03 KG/M2 | SYSTOLIC BLOOD PRESSURE: 126 MMHG | OXYGEN SATURATION: 100 % | WEIGHT: 170 LBS | HEIGHT: 72 IN | HEART RATE: 63 BPM | TEMPERATURE: 98.1 F

## 2025-04-27 DIAGNOSIS — M79.10 MYALGIA: Primary | ICD-10-CM

## 2025-04-27 LAB
ALBUMIN SERPL BCP-MCNC: 4.3 G/DL (ref 3.4–5)
ALP SERPL-CCNC: 48 U/L (ref 33–120)
ALT SERPL W P-5'-P-CCNC: 22 U/L (ref 10–52)
ANION GAP SERPL CALCULATED.3IONS-SCNC: 11 MMOL/L (ref 10–20)
AST SERPL W P-5'-P-CCNC: 27 U/L (ref 9–39)
BASOPHILS # BLD AUTO: 0.03 X10*3/UL (ref 0–0.1)
BASOPHILS NFR BLD AUTO: 0.8 %
BILIRUB SERPL-MCNC: 0.4 MG/DL (ref 0–1.2)
BUN SERPL-MCNC: 9 MG/DL (ref 6–23)
CALCIUM SERPL-MCNC: 9.1 MG/DL (ref 8.6–10.3)
CHLORIDE SERPL-SCNC: 107 MMOL/L (ref 98–107)
CK SERPL-CCNC: 259 U/L (ref 0–325)
CO2 SERPL-SCNC: 25 MMOL/L (ref 21–32)
CREAT SERPL-MCNC: 0.94 MG/DL (ref 0.5–1.3)
EGFRCR SERPLBLD CKD-EPI 2021: >90 ML/MIN/1.73M*2
EOSINOPHIL # BLD AUTO: 0.22 X10*3/UL (ref 0–0.7)
EOSINOPHIL NFR BLD AUTO: 5.6 %
ERYTHROCYTE [DISTWIDTH] IN BLOOD BY AUTOMATED COUNT: 14.1 % (ref 11.5–14.5)
FLUAV RNA RESP QL NAA+PROBE: NOT DETECTED
FLUBV RNA RESP QL NAA+PROBE: NOT DETECTED
GLUCOSE SERPL-MCNC: 115 MG/DL (ref 74–99)
HCT VFR BLD AUTO: 43.2 % (ref 41–52)
HGB BLD-MCNC: 14.6 G/DL (ref 13.5–17.5)
IMM GRANULOCYTES # BLD AUTO: 0 X10*3/UL (ref 0–0.7)
IMM GRANULOCYTES NFR BLD AUTO: 0 % (ref 0–0.9)
LYMPHOCYTES # BLD AUTO: 1.26 X10*3/UL (ref 1.2–4.8)
LYMPHOCYTES NFR BLD AUTO: 32.1 %
MCH RBC QN AUTO: 29.4 PG (ref 26–34)
MCHC RBC AUTO-ENTMCNC: 33.8 G/DL (ref 32–36)
MCV RBC AUTO: 87 FL (ref 80–100)
MONOCYTES # BLD AUTO: 0.3 X10*3/UL (ref 0.1–1)
MONOCYTES NFR BLD AUTO: 7.7 %
NEUTROPHILS # BLD AUTO: 2.11 X10*3/UL (ref 1.2–7.7)
NEUTROPHILS NFR BLD AUTO: 53.8 %
NRBC BLD-RTO: 0 /100 WBCS (ref 0–0)
PLATELET # BLD AUTO: 256 X10*3/UL (ref 150–450)
POTASSIUM SERPL-SCNC: 3.8 MMOL/L (ref 3.5–5.3)
PROT SERPL-MCNC: 6.7 G/DL (ref 6.4–8.2)
RBC # BLD AUTO: 4.96 X10*6/UL (ref 4.5–5.9)
SARS-COV-2 RNA RESP QL NAA+PROBE: NOT DETECTED
SODIUM SERPL-SCNC: 139 MMOL/L (ref 136–145)
WBC # BLD AUTO: 3.9 X10*3/UL (ref 4.4–11.3)

## 2025-04-27 PROCEDURE — 82550 ASSAY OF CK (CPK): CPT | Performed by: PHYSICIAN ASSISTANT

## 2025-04-27 PROCEDURE — 2500000004 HC RX 250 GENERAL PHARMACY W/ HCPCS (ALT 636 FOR OP/ED): Mod: JZ | Performed by: PHYSICIAN ASSISTANT

## 2025-04-27 PROCEDURE — 96374 THER/PROPH/DIAG INJ IV PUSH: CPT

## 2025-04-27 PROCEDURE — 96361 HYDRATE IV INFUSION ADD-ON: CPT

## 2025-04-27 PROCEDURE — 85025 COMPLETE CBC W/AUTO DIFF WBC: CPT | Performed by: PHYSICIAN ASSISTANT

## 2025-04-27 PROCEDURE — 80053 COMPREHEN METABOLIC PANEL: CPT | Performed by: PHYSICIAN ASSISTANT

## 2025-04-27 PROCEDURE — 87636 SARSCOV2 & INF A&B AMP PRB: CPT | Performed by: PHYSICIAN ASSISTANT

## 2025-04-27 PROCEDURE — 36415 COLL VENOUS BLD VENIPUNCTURE: CPT | Performed by: PHYSICIAN ASSISTANT

## 2025-04-27 PROCEDURE — 99284 EMERGENCY DEPT VISIT MOD MDM: CPT | Mod: 25

## 2025-04-27 RX ORDER — IBUPROFEN 600 MG/1
600 TABLET ORAL EVERY 6 HOURS PRN
Qty: 15 TABLET | Refills: 0 | Status: SHIPPED | OUTPATIENT
Start: 2025-04-27 | End: 2025-05-04

## 2025-04-27 RX ORDER — KETOROLAC TROMETHAMINE 15 MG/ML
15 INJECTION, SOLUTION INTRAMUSCULAR; INTRAVENOUS ONCE
Status: COMPLETED | OUTPATIENT
Start: 2025-04-27 | End: 2025-04-27

## 2025-04-27 RX ADMIN — SODIUM CHLORIDE 1000 ML: 900 INJECTION, SOLUTION INTRAVENOUS at 09:16

## 2025-04-27 RX ADMIN — KETOROLAC TROMETHAMINE 15 MG: 15 INJECTION, SOLUTION INTRAMUSCULAR; INTRAVENOUS at 09:16

## 2025-04-27 ASSESSMENT — LIFESTYLE VARIABLES
TOTAL SCORE: 0
EVER HAD A DRINK FIRST THING IN THE MORNING TO STEADY YOUR NERVES TO GET RID OF A HANGOVER: NO
HAVE YOU EVER FELT YOU SHOULD CUT DOWN ON YOUR DRINKING: NO
HAVE PEOPLE ANNOYED YOU BY CRITICIZING YOUR DRINKING: NO
EVER FELT BAD OR GUILTY ABOUT YOUR DRINKING: NO

## 2025-04-27 ASSESSMENT — PAIN SCALES - GENERAL: PAINLEVEL_OUTOF10: 5 - MODERATE PAIN

## 2025-04-27 ASSESSMENT — PAIN - FUNCTIONAL ASSESSMENT: PAIN_FUNCTIONAL_ASSESSMENT: 0-10

## 2025-04-27 ASSESSMENT — PAIN DESCRIPTION - DESCRIPTORS: DESCRIPTORS: ACHING

## 2025-04-27 NOTE — LETTER
April 28, 2025    Patient: Cameron Agarwal   YOB: 1980   Date of Visit: 4/27/2025       To Whom It May Concern:    Caemron Agarwal was seen and treated in our emergency department on 4/27/2025. He may return to workl on 04/28/2025    If you have any questions or concerns, please don't hesitate to call.              CC: No Recipients

## 2025-04-27 NOTE — ED TRIAGE NOTES
Pt states that he started taking new vitamins, prescribed by his MD, and is now having all over body aches. Pt record shows he was started on Vitamin D and a multivitamin.

## 2025-04-27 NOTE — ED PROVIDER NOTES
HPI   Chief Complaint   Patient presents with    Muscle Pain       Pt states that he started taking new vitamins, prescribed by his MD, and is now having all over body aches. Pt record shows he was started on Vitamin D and a multivitamin.           HPI  Patient 44-year-old male reports that for about 1 week he been feeling malaise, generalized bodyaches.  He recently started a new multivitamin and is on losartan.  Patient discontinued his daily vitamin thinking that may be related..  The patient denies any other location of symptoms otherwise.      Patient History   Medical History[1]  Surgical History[2]  Family History[3]  Social History[4]    Physical Exam   ED Triage Vitals [04/27/25 0818]   Temperature Heart Rate Respirations BP   36.7 °C (98.1 °F) 81 20 141/76      Pulse Ox Temp Source Heart Rate Source Patient Position   100 % Temporal -- --      BP Location FiO2 (%)     -- --       Physical Exam  PHYSICAL EXAMINATION    GENERAL APPEARANCE: Awake and alert.     VITAL SIGNS: As per the nurses' triage record.     HEENT: Normocephalic, atraumatic. Extraocular muscles are intact. Pupils equal round and reactive to light. Conjunctiva are pink. Negative scleral icterus. Mucous membranes are moist. Tongue in the midline. Pharynx was without erythema or exudates, uvula midline    NECK: Soft Nontender and supple, full gross ROM, no meningeal signs.    CHEST: Nontender to palpation. Clear to auscultation bilaterally. No rales, rhonchi, or wheezing.     HEART: S1, S2. Regular rate and rhythm. No murmurs, gallops or rubs.  Strong and equal pulses in the extremities.     ABDOMEN: Soft, nontender, nondistended, positive bowel sounds, no palpable masses.    MUSCULOSKELETAL: The calves are nontender to palpation. Full gross active range of motion. Ambulating on own with no acute difficulties     NEUROLOGICAL: Awake, alert and oriented x 3. Power intact in the upper and lower extremities. Sensation is intact to light touch in  the upper and lower extremities.     Interval Baseline; National Institutes of Health Stroke Scale Score  1a. LOC Alert, Keenly Responsive (0), 1b. LOC Questions Answers Both Questions Correctly (0), 1c. LOC Commands Performs Both Task Correctly (0), 2. Best Gaze Normal (0), 3. Visual No Visual Loss (0), 4. Facial Palsy Normal Symmetrical Movements (0), 5a. Motor Arm Left No Drift - Limb Holds 90 or 45 Degrees for Full 10 Seconds (0), 5b. Motor Arm Right No Drift - Limb Holds 90 or 45 Degrees for Full 10 Seconds (0), 6a. Motor Leg Left No Drift - Limb Holds 90 or 45 Degrees for Full 10 Seconds (0), 6b. Motor Leg Right No Drift - Limb Holds 90 or 45 Degrees for Full 10 Seconds (0), 7. Limb Ataxia Absent (0), 8. Sensory Normal - No Sensory Loss (0), 9. Best Language No Aphasia - Normal (0), 10. Dysarthria Normal (0), 11. Extinction and Inattention No Abnormality (0), Stroke Scale Total 0.    Additionally, the patient has a reassuring test of skew.    IMMUNOLOGICAL: No lymphatic streaking noted     DERM: No petechiae, rashes, or ecchymoses.    ED Course & MDM   ED Course as of 04/27/25 0954   Sun Apr 27, 2025   0946 Told pt of all findings, suggest follow up  [AP]      ED Course User Index  [AP] Doug Perez PA-C         Diagnoses as of 04/27/25 0954   Myalgia                 No data recorded     Clarion Coma Scale Score: 15 (04/27/25 0821 : María Patten RN)                           Medical Decision Making  Parts of this chart have been completed using voice recognition software. Please excuse any errors of transcription.  My thought process and reason for plan has been formulated from the time that I saw the patient until the time of disposition and is not specific to one specific moment during their visit and furthermore my MDM encompasses this entire chart and not only this text box.      HPI: Detailed above.    Exam: A medically appropriate exam performed, outlined above, given the known history and  presentation.    History Limited by: Nothing    History obtained from: The patient    External/internal records reviewed: See below for further details    Social Determinants of Health considered during this visit: Lives at    Chronic conditions impacting care: Denies    Medications given during visit:  Medications   sodium chloride 0.9 % bolus 1,000 mL (1,000 mL intravenous New Bag 4/27/25 0916)   ketorolac (Toradol) injection 15 mg (15 mg intravenous Given 4/27/25 0916)        Diagnostic/tests  Labs Reviewed   CBC WITH AUTO DIFFERENTIAL - Abnormal       Result Value    WBC 3.9 (*)     nRBC 0.0      RBC 4.96      Hemoglobin 14.6      Hematocrit 43.2      MCV 87      MCH 29.4      MCHC 33.8      RDW 14.1      Platelets 256      Neutrophils % 53.8      Immature Granulocytes %, Automated 0.0      Lymphocytes % 32.1      Monocytes % 7.7      Eosinophils % 5.6      Basophils % 0.8      Neutrophils Absolute 2.11      Immature Granulocytes Absolute, Automated 0.00      Lymphocytes Absolute 1.26      Monocytes Absolute 0.30      Eosinophils Absolute 0.22      Basophils Absolute 0.03     COMPREHENSIVE METABOLIC PANEL - Abnormal    Glucose 115 (*)     Sodium 139      Potassium 3.8      Chloride 107      Bicarbonate 25      Anion Gap 11      Urea Nitrogen 9      Creatinine 0.94      eGFR >90      Calcium 9.1      Albumin 4.3      Alkaline Phosphatase 48      Total Protein 6.7      AST 27      Bilirubin, Total 0.4      ALT 22     CREATINE KINASE - Normal    Creatine Kinase 259     SARS-COV-2 AND INFLUENZA A/B PCR - Normal    Flu A Result Not Detected      Flu B Result Not Detected      Coronavirus 2019, PCR Not Detected      Narrative:     This assay is an FDA-cleared, in vitro diagnostic nucleic acid amplification test for the qualitative detection and differentiation of SARS CoV-2/ Influenza A/B from nasopharyngeal specimens collected from individuals with signs and symptoms of respiratory tract infections, and has been  validated for use at [facility]. Negative results do not preclude COVID-19/ Influenza A/B infections and should not be used as the sole basis for diagnosis, treatment, or other management decisions. Testing for SARS CoV-2 is recommended only for patients who meet current clinical and/or epidemiological criteria defined by federal, state, or local public health directives.      No orders to display          Case discussed with: ED attending physician Dr. Manrique -discussed the case who agrees with disposition however did not have face-to-face evaluation with the patient      Considerations/further MDM:  Differential diagnosis includes dehydration, electrolyte disturbance, rhabdomyolysis, sepsis, the patient has no sign at this time of respiratory distress or compromise.    I reviewed the ER visit from 4/4/2025 for which patient was diagnosed with myalgia and dehydration and at that time also had a very extensive evaluation including D-dimer and laboratory studies.    I reviewed the ED record from 4/13/2025 for which patient was diagnosed with myalgia.  At which point that patient also had laboratory studies and was ultimately discharged.    Today patient has reassuring vital signs, up and ambulatory, appears very well, says he feels fine now after IV fluids and Toradol, perhaps a component of dehydration.  However CK value was not previously assessed on previous visits however today was and was within normal limits.  The patient will be advised return precaution follow-up instructions I will also provide a referral to rheumatology has he seems to be having some ongoing muscle pains and a specialist may be able to help them further manage.    I have recommended close return precautions and follow-up instructions.      Procedure  Procedures       [1] No past medical history on file.  [2] No past surgical history on file.  [3]   Family History  Problem Relation Name Age of Onset    Colon cancer  Neg Hx     [4]   Social History  Tobacco Use    Smoking status: Every Day     Current packs/day: 0.25     Types: Cigarettes    Smokeless tobacco: Never    Tobacco comments:     Working to stop. Down to under 1/4 ppd   Vaping Use    Vaping status: Never Used   Substance Use Topics    Alcohol use: Yes     Comment: OCASSIONAL    Drug use: Never        Doug Perez PA-C  04/27/25 0954

## 2025-04-27 NOTE — DISCHARGE INSTRUCTIONS
"It is strongly advised he follow-up with your family doctor to have this further evaluated, you have been seen in the emergency department couple of times for this.  And your workup does not appear to be significantly concerning from an emergency department setting    Thank you for allowing us to take care of you today. While you are home, you might receive a survey about your care in our hospital. Your nurse and myself, Doug Perez PA-C, would love your honest feedback on how we can improve your care. Your feedback and especially your positive comments help our hospital receive the support we need to continue to serve you and your family. Thank you again for trusting us with your care.\"    Be sure to follow up as directed in 1-2 days.  All of the details of your follow up instructions are detailed in the follow up section of this packet.         It is important to remember that your care does not end here and you must continue to monitor your condition closely. Please return to the emergency department for any worsening or concerning signs or symptoms as directed by our conversations and the discharge instructions. Otherwise please follow up with your doctor in 2 days if no better or worse. If you do not have a doctor please contact the referral number on your discharge instructions. Please contact any physician specialists provided in your discharge notes as it is very important to follow up with them regarding your condition. If you are unable to reach the physicians provided, please come back to the Emergency Department at any time.        Return to emergency room without delay for ANY new or worsening pains or for any other symptoms or concerns.      "

## 2025-04-30 ENCOUNTER — OFFICE VISIT (OUTPATIENT)
Dept: PRIMARY CARE | Facility: CLINIC | Age: 45
End: 2025-04-30
Payer: COMMERCIAL

## 2025-04-30 ENCOUNTER — APPOINTMENT (OUTPATIENT)
Dept: LAB | Facility: HOSPITAL | Age: 45
End: 2025-04-30
Payer: COMMERCIAL

## 2025-04-30 VITALS
SYSTOLIC BLOOD PRESSURE: 120 MMHG | HEIGHT: 72 IN | DIASTOLIC BLOOD PRESSURE: 80 MMHG | WEIGHT: 167 LBS | BODY MASS INDEX: 22.62 KG/M2 | TEMPERATURE: 98.5 F | HEART RATE: 70 BPM

## 2025-04-30 DIAGNOSIS — R20.2 TINGLING: ICD-10-CM

## 2025-04-30 DIAGNOSIS — I10 PRIMARY HYPERTENSION: Primary | ICD-10-CM

## 2025-04-30 DIAGNOSIS — M79.10 MYALGIA: ICD-10-CM

## 2025-04-30 LAB — PROT SERPL-MCNC: 6.6 G/DL (ref 6.4–8.2)

## 2025-04-30 PROCEDURE — 84165 PROTEIN E-PHORESIS SERUM: CPT

## 2025-04-30 PROCEDURE — 3079F DIAST BP 80-89 MM HG: CPT | Performed by: INTERNAL MEDICINE

## 2025-04-30 PROCEDURE — 3008F BODY MASS INDEX DOCD: CPT | Performed by: INTERNAL MEDICINE

## 2025-04-30 PROCEDURE — 3074F SYST BP LT 130 MM HG: CPT | Performed by: INTERNAL MEDICINE

## 2025-04-30 PROCEDURE — 99214 OFFICE O/P EST MOD 30 MIN: CPT | Performed by: INTERNAL MEDICINE

## 2025-04-30 RX ORDER — NABUMETONE 500 MG/1
1000 TABLET, FILM COATED ORAL 2 TIMES DAILY
Qty: 120 TABLET | Refills: 1 | Status: SHIPPED | OUTPATIENT
Start: 2025-04-30 | End: 2026-04-30

## 2025-04-30 NOTE — LETTER
May 1, 2025     Patient: Cameron Agarwal   YOB: 1980   Date of Visit: 4/30/2025       To Whom It May Concern:    Cameron Agarwal was seen in my clinic on 4/30/2025 at 11:00 am. Please excuse Cameron for his absence from work on this day.    If you have any questions or concerns, please don't hesitate to call.         Sincerely,         Jm Chaparro MD

## 2025-04-30 NOTE — PROGRESS NOTES
Subjective   Patient ID: Cameron Agarwal is a 44 y.o. male who presents with multiple complaints    HPI   Throughout the month of April, the patient reports a history of near constant pulsating sensation throughout both lower extremities and to a lesser extent throughout both upper extremities.  He reports that the sensation sometimes increases in intensity with increased activity.  Throughout the month, he does report intermittent episodes of tingling throughout the lower extremities and upper extremities as well but he reports no precipitating, exacerbating, relieving factors..  He reports no associated upper extremity, lower extremity weakness swelling, preceding trauma/overexertion  Review of Systems    Objective   There were no vitals taken for this visit.    Physical Exam  Neurologic  Mental status-alert and oriented x3   Cranial nerves-2 through 12 grossly intact, no visual field abnormalities  Motor-no pronator drift noted, strength-5/5 in all muscle groups tested, , no tremor noted.  No bradykinesia noted.  No rigidity noted.  Negative pull test  Sensory-Light touch sensation fully intact  Pinprick sensation fully intact  Vibratory sensation fully intact  Cerebellar-no truncal ataxia, good coordination finger-nose testing,, good coordination heel-to-shin testing, normal rapid alternating movements  Romberg negative, no abnormality in tandem gait  Reflexes-1+/4 bilaterally  Assessment/Plan   Problem List Items Addressed This Visit           ICD-10-CM    Hypertension - Primary I10    Relevant Orders    Vitamin B12 (Completed)    Serum Protein Electrophoresis (Completed)    ANGELINE (Completed)    C-Reactive Protein (Completed)    Myalgia M79.10    Relevant Medications    nabumetone (Relafen) 500 mg tablet    Other Relevant Orders    Vitamin B12 (Completed)    Serum Protein Electrophoresis (Completed)    ANGELINE (Completed)    C-Reactive Protein (Completed)    Thyroid Stimulating Hormone (Completed)     Other Visit  Diagnoses         Codes      Tingling     R20.2    Relevant Medications    nabumetone (Relafen) 500 mg tablet    Other Relevant Orders    Vitamin B12 (Completed)    Serum Protein Electrophoresis (Completed)    ANGELINE (Completed)    C-Reactive Protein (Completed)    Thyroid Stimulating Hormone (Completed)             Assessment  Hypertension  Near constant pulsating sensation throughout the lower extremities greater than upper extremities, intermittent episodes of tingling throughout the lower extremities greater than upper extremities-unsure of etiology.  May be secondary to diabetic neuropathy, myopathy, fibromyalgia  Type 2 diabetes mellitus  Plan  Obtain B12 level, ANGELINE, CRP, serum immunoelectrophoresis, TSH today

## 2025-04-30 NOTE — LETTER
May 1, 2025     Patient: Cameron Agarwal   YOB: 1980   Date of Visit: 4/30/2025       To Whom It May Concern:    It is my medical opinion that Cameron Agarwal {Work release (duty restriction):87210}.    If you have any questions or concerns, please don't hesitate to call.         Sincerely,        Jm Chaparro MD    CC: No Recipients

## 2025-05-01 LAB
ANA SER QL IF: NEGATIVE
CRP SERPL-MCNC: <3 MG/L
TSH SERPL-ACNC: 1.11 MIU/L (ref 0.4–4.5)
VIT B12 SERPL-MCNC: 491 PG/ML (ref 200–1100)

## 2025-05-02 LAB
ALBUMIN: 4.3 G/DL (ref 3.4–5)
ALPHA 1 GLOBULIN: 0.3 G/DL (ref 0.2–0.6)
ALPHA 2 GLOBULIN: 0.6 G/DL (ref 0.4–1.1)
BETA GLOBULIN: 0.7 G/DL (ref 0.5–1.2)
GAMMA GLOBULIN: 0.7 G/DL (ref 0.5–1.4)
PATH REVIEW-SERUM PROTEIN ELECTROPHORESIS: NORMAL
PROTEIN ELECTROPHORESIS COMMENT: NORMAL

## 2025-05-03 RX ORDER — DULOXETIN HYDROCHLORIDE 20 MG/1
20 CAPSULE, DELAYED RELEASE ORAL DAILY
Qty: 30 CAPSULE | Refills: 2 | Status: SHIPPED | OUTPATIENT
Start: 2025-05-03 | End: 2025-08-01

## 2025-05-06 ENCOUNTER — OFFICE VISIT (OUTPATIENT)
Facility: CLINIC | Age: 45
End: 2025-05-06
Payer: COMMERCIAL

## 2025-05-06 VITALS
TEMPERATURE: 98.2 F | SYSTOLIC BLOOD PRESSURE: 117 MMHG | DIASTOLIC BLOOD PRESSURE: 79 MMHG | BODY MASS INDEX: 22.48 KG/M2 | HEART RATE: 84 BPM | HEIGHT: 72 IN | WEIGHT: 166 LBS

## 2025-05-06 DIAGNOSIS — M79.10 MYALGIA: ICD-10-CM

## 2025-05-06 DIAGNOSIS — E55.9 VITAMIN D DEFICIENCY: Primary | ICD-10-CM

## 2025-05-06 PROCEDURE — 3074F SYST BP LT 130 MM HG: CPT | Performed by: STUDENT IN AN ORGANIZED HEALTH CARE EDUCATION/TRAINING PROGRAM

## 2025-05-06 PROCEDURE — 99204 OFFICE O/P NEW MOD 45 MIN: CPT | Performed by: STUDENT IN AN ORGANIZED HEALTH CARE EDUCATION/TRAINING PROGRAM

## 2025-05-06 PROCEDURE — 3078F DIAST BP <80 MM HG: CPT | Performed by: STUDENT IN AN ORGANIZED HEALTH CARE EDUCATION/TRAINING PROGRAM

## 2025-05-06 PROCEDURE — 4004F PT TOBACCO SCREEN RCVD TLK: CPT | Performed by: STUDENT IN AN ORGANIZED HEALTH CARE EDUCATION/TRAINING PROGRAM

## 2025-05-06 PROCEDURE — 3008F BODY MASS INDEX DOCD: CPT | Performed by: STUDENT IN AN ORGANIZED HEALTH CARE EDUCATION/TRAINING PROGRAM

## 2025-05-06 NOTE — PROGRESS NOTES
Rheumatology New Outpatient  Note    Subjective   Cameron Agarwal is a 44 y.o. male presenting today for Myalgia and Fatigue.    History of Presenting Problem:   Cameron with PMHx of HTN is presenting today as a NP for myalgia    He was referred for chronic aches and pain that started a month ago. It started after a flu disease.  It comes and goes. Its all over his body not specific to joints. He has chronic fatigue. He doesn't sleep well at night. He wakes up multiple times. He wakes up unrefreshed. He also has numbness and tingling in his feet. He denies low back pain. No joint swelling. He has no muscle weakness.     He denies hair loss, malar rash, photosensitivity, skin rashes, sicca sx, recurrent mouth sores, sudden vision loss, sudden hearing loss, seizures, psychosis, inflammatory eye disease, h/o blood clots, cold sensitivity in fingers, vasospastic color changes in fingers when exposed to extreme temperatures and muscle weakness.    Past Medical History: Medical History[1]    Allergies: Allergies[2]    Medications: Current Medications[3]    Review of Systems:   All 10 review of systems have been reviewed and are negative for complaint except as noted in the HPI    Objective   Physical Examination:  Vitals:    05/06/25 1044   BP: 117/79   Pulse: 84   Temp: 36.8 °C (98.2 °F)     Growth %ile SmartLinks can only be used for patients less than 20 years old.  Ht Readings from Last 1 Encounters:   05/06/25 1.829 m (6')     Wt Readings from Last 1 Encounters:   05/06/25 75.3 kg (166 lb)       General - NAD, sitting up in chair, well-groomed, pleasant, AAOx3  Head: Normocephalic, atraumatic  Eyes - PERRLA, EOMI. No conjunctiva injection.   Skin - No rashes or ulcers. Skin warm and dry. No erythema on bilateral cheeks.  Extremities - No edema, cyanosis ,or clubbing  Neurological - Alert and oriented x 3,  grossly intact. No focal deficit.  Musculoskeletal -  Shoulders: Full ROM, without pain, no swelling, warmth or  tenderness.  Elbows: Full ROM, without pain, no swelling, warmth or tenderness.  Wrists: Full ROM, without pain, no swelling, warmth or tenderness.  MCP: No swelling, warmth or tenderness. Metacarpal squeeze negative  PIP: No swelling, warmth or tenderness.  DIP: No swelling, warmth or tenderness.  Hands : 5/5.    Sacroiliac joints: No local tenderness. FRANKLIN negative.   Hips: Full ROM.  No malalignment.  Knees:  Full ROM, without pain, no swelling, warmth or point tenderness.   Ankles: Full ROM, without pain, swelling, warmth or tenderness.  Toes:  Metatarsal squeeze negative  Cervical spine: No tenderness or limitation of movement  Lumbar spine: No tenderness or limitation of movement        Laboratory Testin2025: ANGELINE-, TSH normal, CRP normal, CK normal. CBC with WBC 3.9, CMP normal      Assessment/Plan   Cameron Agarwal is a 44 y.o. male with PMHx of HTN who is presenting today as a NP for myalgia    ##Myalgia:   -Non specific. No muscle weakness. TSH normal. ANGELINE-. CK normal  -?post viral?  -Encouraged to exercise  -Advised patient to improve sleep hygiene  -Check vitamin D      The assessment and plan, risk and benefits were discussed with the patient. All of the patients questions were answered and patient agrees to the plan.      Senia Trinidad MD  Clinical   Department of Rheumatology   [facility]           [1] History reviewed. No pertinent past medical history.  [2]   Allergies  Allergen Reactions    Pollen Extracts Runny nose   [3]   Current Outpatient Medications:     DULoxetine (Cymbalta) 20 mg DR capsule, Take 1 capsule (20 mg) by mouth once daily. Do not crush or chew., Disp: 30 capsule, Rfl: 2    losartan (Cozaar) 100 mg tablet, Take 1 tablet (100 mg) by mouth once daily., Disp: 90 tablet, Rfl: 3    nabumetone (Relafen) 500 mg tablet, Take 2 tablets (1,000 mg) by mouth 2 times a day., Disp: 120 tablet, Rfl: 1

## 2025-05-07 ENCOUNTER — TELEPHONE (OUTPATIENT)
Facility: CLINIC | Age: 45
End: 2025-05-07
Payer: COMMERCIAL

## 2025-05-07 DIAGNOSIS — E55.9 VITAMIN D DEFICIENCY: Primary | ICD-10-CM

## 2025-05-07 LAB — 25(OH)D3+25(OH)D2 SERPL-MCNC: 9 NG/ML (ref 30–100)

## 2025-05-07 RX ORDER — ERGOCALCIFEROL 1.25 MG/1
1.25 CAPSULE ORAL WEEKLY
Qty: 12 CAPSULE | Refills: 0 | Status: SHIPPED | OUTPATIENT
Start: 2025-05-07

## 2025-05-21 ENCOUNTER — APPOINTMENT (OUTPATIENT)
Dept: PRIMARY CARE | Facility: CLINIC | Age: 45
End: 2025-05-21
Payer: COMMERCIAL

## 2025-05-21 VITALS — HEART RATE: 73 BPM | DIASTOLIC BLOOD PRESSURE: 58 MMHG | SYSTOLIC BLOOD PRESSURE: 93 MMHG | TEMPERATURE: 97.7 F

## 2025-05-21 DIAGNOSIS — E55.9 VITAMIN D DEFICIENCY: Primary | ICD-10-CM

## 2025-05-21 DIAGNOSIS — Z02.89 ENCOUNTER FOR COMPLETION OF FORM WITH PATIENT: ICD-10-CM

## 2025-05-21 DIAGNOSIS — R73.02 IGT (IMPAIRED GLUCOSE TOLERANCE): ICD-10-CM

## 2025-05-21 PROCEDURE — 99213 OFFICE O/P EST LOW 20 MIN: CPT | Performed by: INTERNAL MEDICINE

## 2025-05-21 PROCEDURE — 3078F DIAST BP <80 MM HG: CPT | Performed by: INTERNAL MEDICINE

## 2025-05-21 PROCEDURE — 4004F PT TOBACCO SCREEN RCVD TLK: CPT | Performed by: INTERNAL MEDICINE

## 2025-05-21 PROCEDURE — 3074F SYST BP LT 130 MM HG: CPT | Performed by: INTERNAL MEDICINE

## 2025-05-21 ASSESSMENT — ENCOUNTER SYMPTOMS
POLYDIPSIA: 0
CHILLS: 0
SHORTNESS OF BREATH: 0
FEVER: 0
COUGH: 0
PALPITATIONS: 0

## 2025-05-21 NOTE — PATIENT INSTRUCTIONS
Continue the weekly 88432 unit vitamin d dose.   For the next 30 days, I want you to also use the over the counter vitamin D as well. Use 2000 units once a day as well.     After 1 month of both vitamins together, stop the daily over the counter version and remain on the weekly dose only.

## 2025-05-21 NOTE — PROGRESS NOTES
Subjective   Patient ID: Cameron Agarwal is a 44 y.o. male who presents for Follow-up (LAB CONCERNS).    44-year-old male presents today in follow-up after visits with emergency room and rheumatology for myalgias.  Ultimately found to be severely low on vitamin D he has now started weekly vitamin D.  He continues to be too achy and fatigued and his muscles to return to work immediately will amend his FMLA paperwork patient advised about recommendations changes to vitamin D regimen to expedite his levels improving advised and provided to the patient today.  Additional time spent in completion of FMLA paperwork.    Resume work June 2nd, light duty/part time duty for 2 weeks. Then resume full duty    Pt presents in follow for severe vitamin d deficiency manifesting with diffuse myalgia. On vitamin D weekly dose now.          Review of Systems   Constitutional:  Negative for chills and fever.   Respiratory:  Negative for cough and shortness of breath.    Cardiovascular:  Negative for chest pain and palpitations.   Endocrine: Negative for polydipsia and polyuria.       Objective   BP 93/58   Pulse 73   Temp 36.5 °C (97.7 °F) (Temporal)     Physical Exam  Constitutional:       Appearance: Normal appearance.   HENT:      Head: Normocephalic and atraumatic.   Eyes:      Extraocular Movements: Extraocular movements intact.      Pupils: Pupils are equal, round, and reactive to light.   Neck:      Thyroid: No thyroid mass or thyromegaly.      Vascular: No carotid bruit.   Cardiovascular:      Rate and Rhythm: Normal rate and regular rhythm.      Heart sounds: No murmur heard.     No friction rub. No gallop.   Pulmonary:      Effort: No respiratory distress.      Breath sounds: No wheezing, rhonchi or rales.   Musculoskeletal:      Cervical back: Neck supple.      Right lower leg: No edema.      Left lower leg: No edema.   Lymphadenopathy:      Cervical: No cervical adenopathy.   Neurological:      Mental Status: He is  alert.         Assessment/Plan   Assessment & Plan  Vitamin D deficiency         Encounter for completion of form with patient         IGT (impaired glucose tolerance)    Orders:    Referral to Nutrition Services; Future

## 2025-05-23 ENCOUNTER — TELEMEDICINE CLINICAL SUPPORT (OUTPATIENT)
Dept: NUTRITION | Facility: CLINIC | Age: 45
End: 2025-05-23
Payer: COMMERCIAL

## 2025-05-23 DIAGNOSIS — R73.02 IGT (IMPAIRED GLUCOSE TOLERANCE): Primary | ICD-10-CM

## 2025-05-23 PROCEDURE — 97802 MEDICAL NUTRITION INDIV IN: CPT | Mod: 95 | Performed by: DIETITIAN, REGISTERED

## 2025-05-23 NOTE — PROGRESS NOTES
Nutrition Initial Assessment:     Patient Cameron Agarwal is a 44 y.o. male being seen via virtual visit, phone call only who was referred by Dr. Bethea on 5/21/25  5 for   1. IGT (impaired glucose tolerance)            Virtual or Telephone Consent    An interactive audio and video telecommunication system which permits real time communications between the patient (at the originating site) and provider (at the distant site) was utilized to provide this telehealth service.   Verbal consent was requested and obtained from Cameron Agarwal on this date, 05/23/25 for a telehealth visit and the patient's location was confirmed at the time of the visit.    Nutrition Assessment    Problem List[1]    Nutrition History:  Food & Nutrition History:   Pt did not realize he was diabetic.  He has dry mouth, has lost 8# in the past month. He works M-F, 9-5 pm  Food Allergies: None;  Food Intolerances: None  Vitamin/mineral intake: Vitamin D  Herbal supplements: None  Medication and Complementary/Alternative Medicine Use:   GI Symptoms: GI Symptoms : None  Mouth Issues: Oral Problems: denies; Teeth Issues: Dentition : own  Sleep Habits: 5-6 hours continuous, 5-6 hours disrupted, 7+ hours continuous, 7+ hours disrupted, varies  Wake: 7 am  Bed; 11 pm    Diet Recall:  2 meals, more than he used it  Meal 1:   8-9 am - this is new  - 1-2 white toast, 2-3 eggs, 3-4 sausage  Snack:   Meal 2:  2-3 pm - salad - lettuce, tomato, cheese, eggs or pork chop  Snack:  Meal 3:  6-7pm  cheese - not sure what to eat  Snack:  honey roasted nuts throughout the day, fruit  Food Variety: present  Oral Nutrition Supplement Use: None   Fluid Intake:   had a few beers yesterday, water -w/flavored packets - 64 oz  Energy Intake: Good (>/= 75% EEN)    Diabetes Nutrition History:  Diagnosed: not diagnosed it  Prior Nutrition Education: none  SMBG:   Hypoglycemia:   Current DM Medications/Insulin Regimen: none    Food Preparation:  Cooking:  Patient  Grocery Shopping: Patient  Dining Out: 1 to 3 times a week    Physical Activity:   Walk in park 20-45-60 min    Food Security/Insecurity: no issues    Anthropometrics:                            Weight History:   Daily Weight  05/06/25 : 75.3 kg (166 lb)  04/30/25 : 75.8 kg (167 lb)  04/27/25 : 77.1 kg (170 lb)  04/13/25 : 77.3 kg (170 lb 8 oz)  04/09/25 : 75.4 kg (166 lb 3.2 oz)  04/04/25 : 77.5 kg (170 lb 12.8 oz)  03/01/25 : 77.1 kg (170 lb)  10/03/24 : 75.3 kg (166 lb)  08/10/24 : 72.6 kg (160 lb)  08/30/22 : 77.7 kg (171 lb 4 oz)    Weight Change %:  Weight History / % Weight Change: UBW - 170# - in the past 3 weeks he has lost 8#    Nutrition Focused Physical Exam Findings:  Subcutaneous Fat Loss:   Defer Subcutaneous Fat Loss Assessment: Defer all  Defer All Reason: Virtual or phone only visit    Muscle Wasting:  Defer Muscle Wasting Assessment: Defer all  Defer All Reason: Virtual or phone only visit    Physical Findings:  Hair:    Eyes:    Nails:    Skin:    Respiratory:    Edema:        Nutrition Significant Labs:  Last Chem:     Chemistry    Lab Results   Component Value Date/Time     04/27/2025 0858    K 3.8 04/27/2025 0858     04/27/2025 0858    CO2 25 04/27/2025 0858    BUN 9 04/27/2025 0858    CREATININE 0.94 04/27/2025 0858    Lab Results   Component Value Date/Time    CALCIUM 9.1 04/27/2025 0858    ALKPHOS 48 04/27/2025 0858    AST 27 04/27/2025 0858    ALT 22 04/27/2025 0858    BILITOT 0.4 04/27/2025 0858       , CMP Trend:    Recent Labs     04/30/25  1138 04/27/25  0858 04/13/25  0940 04/04/25  0936   GLUCOSE  --  115* 100* 116*   NA  --  139 138 138   K  --  3.8 4.1 4.0   CL  --  107 105 102   CO2  --  25 28 29   ANIONGAP  --  11 9* 11   BUN  --  9 7 6   CREATININE  --  0.94 0.93 0.85   EGFR  --  >90 >90 >90   CALCIUM  --  9.1 9.1 9.9   ALBUMIN  --  4.3 4.3 4.7   ALKPHOS  --  48 51 51   PROT 6.6 6.7 6.9 7.2   AST  --  27 23 27   BILITOT  --  0.4 0.4 0.3   ALT  --  22 23 24  "  , DM Specific Labs Trend (Includes HgbA1C, antibodies & fasting insulin):   Recent Labs     04/09/25  0852 08/30/22  1030   HGBA1C 6.5* 6.1*   , Lipid Panel Trend:    Recent Labs     04/09/25  0852 08/30/22  1030   CHOL 245* 231*   HDL 98 85.8   LDLCALC 128*  --    LDLF  --  129*   VLDL  --  16   TRIG 89 79   , Iron Panel + Serum Ferritin Trend: No results for input(s): \"IRON\", \"UIBC\", \"TIBC\", \"IRONSAT\", \"FERRITIN\" in the last 62168 hours., and Vitamin D:   Lab Results   Component Value Date    VITD25 9 (L) 05/06/2025        Medications:  Current Outpatient Medications   Medication Instructions    DULoxetine (CYMBALTA) 20 mg, oral, Daily, Do not crush or chew.    ergocalciferol (VITAMIN D-2) 1.25 mg, oral, Weekly    losartan (COZAAR) 100 mg, oral, Daily    nabumetone (RELAFEN) 1,000 mg, oral, 2 times daily        Estimated Needs:  Did not discuss   ;     ;     ;     ;                    Nutrition Diagnosis   Malnutrition Diagnosis  Patient has Malnutrition Diagnosis: No    Nutrition Diagnosis  Patient has Nutrition Diagnosis: Yes  Diagnosis Status (1): New  Nutrition Diagnosis 1: Food and nutrition related knowledge deficit  Related to (1): Lack of or limited prior nutrition-related education  As Evidenced by (1): diet recall, need for a diabetic diet  Additional Nutrition Diagnosis: Diagnosis 2  Diagnosis Status (2): New  Nutrition Diagnosis 2: Altered nutrition related to laboratory values  Related to (2): organ dysfunction that leads to biochemical changes  As Evidenced by (2): elevated A1c 6.5%, new dx of diabetes       Nutrition Interventions/Recommendations   Nutrition Prescription: Oral nutrition General Healthy diet with focus on CHO control for DM management    Nutrition Interventions:   Food and Nutrient Delivery:       Coordination of Care:       Nutrition Education:   Nutrition Education Content: Content related nutrition education  Balanced meals using plate method, timing of meals, staying hydrated " with water or other low calorie beverages, benefits from exercise, reviewed basics of what is a CHO and portion sizes of CHO food for meal planning, reviewed normal glucose metabolism and what happens to glucose metabolism with DM    Nutrition Education Topics Discussed:   1) Reviewed diabetic diet including carbohydrate-containing food, appropriate carbs per meal as well as appropriate portion sizes. We discussed that 1 serving of a carbohydrate is equal to 15 gram and patient should consume no more than 3-4 servings per meal for weight maintenance or 2-3 per meal for weight loss. Education materials were provided and meal and snack options discussed. Aim for A1c <7%    2)     We reviewed the importance of having consistent meals and snacks throughout the day to improve or maintain good glycemic control and to increase metabolism to aid with weight loss. Pt should aim to consume a meal or snack every 3-4 hours which contains a lean protein (lean chicken, turkey, fish, eggs, low fat yogurt, nuts, peanut butter, bean) and healthy starch (fruits, whole grains)    3) We reviewed the food plate method to help improve healthy eating habits. We discussed that half of the plate should contain 2 non-starchy vegetables (all vegetable besides peas, corn and potatoes), 1/4 of the plate should contain lean protein and 1/4 of the plate should contain a healthy starch.    4) We discussed the importance of following a heart healthy which is a low saturated fat, low cholesterol, low sodium diet. Choose foods with less than 5 grams (g) of total fat per serving. For someone who needs to eat 2,000 calories per day, 50 g to 75 g per day is a good range. Try to pick foods with heart-healthy fats (monounsaturated and polyunsaturated fats). Choose foods with less than 2 g per serving of saturated fat and 0 g of trans fat. (Saturated fat and trans fat are not heart-healthy.) A person who needs to eat 2,000 calories per day should eat no  more than 11 g to 15 g of saturated fat in one day. Read ingredients listed on the label. If a food contains partially hydrogenated oils, then it has trans fat. (If it has less than half a gram per serving, the label may still say trans fat-free.)     5) We reviewed the importance and compliance with a 2 gm sodium diet. Recommend decreasing high sodium foods such as soups, gravies, regular deli meats, condiments, prepackaged foods, crackers, chips.  One teaspoon of salt contain more than 2000 mg of sodium.  When reviewing a food label, choose foods than have less than 20% of the daily value of sodium     Educational Handouts Provided: ADA Plate Method, NCM CHO Counting for People With Diabetes , and Checking Food Labels for Carbohydrates from the  Carbohydrate Counting Booklet (page 3)     Nutrition Counseling:   Nutrition Counseling Strategies : Nutrition counseling based on motivational interviewing strategy, Nutrition counseling based on goal setting strategy    Patient Goals:      Readiness to Change : Excellent  Level of Understanding : Excellent  Anticipated Compliant : Excellent         Nutrition Monitoring and Evaluation   Food and Nutrient Intake  Monitoring and Evaluation Plan: Meal/snack pattern  Meal/Snack Pattern: Estimated meal and snack pattern  Criteria: Monitor patient's progress towards meal and snack goal(s)         Anthropometric measurements  Monitoring and Evaluation Plan: Weight  Criteria: Monitor patient's progress towards intentional weight loss of 0.5-2 lb per week, trending toward a clinically significant weight loss of 5-10% of current body weight.    Biochemical Data, Medical Tests and Procedures  Monitoring and Evaluation Plan: Lipid profile, Glucose/endocrine profile  Glucose/Endocrine Profile: Hemoglobin A1c (HgbA1c)  Criteria: <7%  Lipid Profile: Triglycerides, serum  Criteria: CHOL <200;  HDL 40-60;  LDL <70;  TG <150 mg/dL         Goal Status:           Follow Up: Patient  declined nutrition follow up appointment. This service will remain available if patient wishes to schedule a follow up. Referral is good for 1 year (expires on 5/21/25). If patient wishes to schedule a follow up, can call Nutrition Scheduling Line at [phone].                [1]   Patient Active Problem List  Diagnosis    Hypertension    Dental cavity    IGT (impaired glucose tolerance)    Myalgia    Dehydration

## 2025-06-06 ENCOUNTER — OFFICE VISIT (OUTPATIENT)
Dept: PRIMARY CARE | Facility: CLINIC | Age: 45
End: 2025-06-06
Payer: COMMERCIAL

## 2025-06-06 ENCOUNTER — HOSPITAL ENCOUNTER (EMERGENCY)
Facility: HOSPITAL | Age: 45
Discharge: HOME | End: 2025-06-06
Attending: STUDENT IN AN ORGANIZED HEALTH CARE EDUCATION/TRAINING PROGRAM
Payer: COMMERCIAL

## 2025-06-06 VITALS
TEMPERATURE: 98 F | WEIGHT: 161 LBS | HEART RATE: 69 BPM | BODY MASS INDEX: 21.84 KG/M2 | DIASTOLIC BLOOD PRESSURE: 77 MMHG | SYSTOLIC BLOOD PRESSURE: 111 MMHG

## 2025-06-06 VITALS
BODY MASS INDEX: 21.67 KG/M2 | HEIGHT: 72 IN | SYSTOLIC BLOOD PRESSURE: 127 MMHG | TEMPERATURE: 97.7 F | WEIGHT: 160 LBS | DIASTOLIC BLOOD PRESSURE: 82 MMHG | RESPIRATION RATE: 17 BRPM | OXYGEN SATURATION: 100 % | HEART RATE: 85 BPM

## 2025-06-06 DIAGNOSIS — R61 HYPERHIDROSIS: Primary | ICD-10-CM

## 2025-06-06 DIAGNOSIS — L74.513 HYPERHIDROSIS OF FEET: Primary | ICD-10-CM

## 2025-06-06 LAB — GLUCOSE BLD MANUAL STRIP-MCNC: 132 MG/DL (ref 74–99)

## 2025-06-06 PROCEDURE — 99214 OFFICE O/P EST MOD 30 MIN: CPT | Performed by: FAMILY MEDICINE

## 2025-06-06 PROCEDURE — 82947 ASSAY GLUCOSE BLOOD QUANT: CPT

## 2025-06-06 PROCEDURE — 3074F SYST BP LT 130 MM HG: CPT | Performed by: FAMILY MEDICINE

## 2025-06-06 PROCEDURE — 3078F DIAST BP <80 MM HG: CPT | Performed by: FAMILY MEDICINE

## 2025-06-06 PROCEDURE — 99282 EMERGENCY DEPT VISIT SF MDM: CPT

## 2025-06-06 PROCEDURE — 4004F PT TOBACCO SCREEN RCVD TLK: CPT | Performed by: FAMILY MEDICINE

## 2025-06-06 PROCEDURE — 99281 EMR DPT VST MAYX REQ PHY/QHP: CPT | Performed by: STUDENT IN AN ORGANIZED HEALTH CARE EDUCATION/TRAINING PROGRAM

## 2025-06-06 RX ORDER — KETOCONAZOLE 20 MG/G
CREAM TOPICAL DAILY
Qty: 60 G | Refills: 2 | Status: SHIPPED | OUTPATIENT
Start: 2025-06-06

## 2025-06-06 RX ORDER — ALUMINUM CHLORIDE 20 %
SOLUTION, NON-ORAL TOPICAL
Qty: 60 ML | Refills: 3 | Status: SHIPPED | OUTPATIENT
Start: 2025-06-06

## 2025-06-06 ASSESSMENT — PAIN SCALES - GENERAL: PAINLEVEL_OUTOF10: 0 - NO PAIN

## 2025-06-06 ASSESSMENT — PAIN - FUNCTIONAL ASSESSMENT: PAIN_FUNCTIONAL_ASSESSMENT: 0-10

## 2025-06-06 NOTE — ED PROVIDER NOTES
HPI   No chief complaint on file.      Patient reports with hyperhidrosis of the feet.  This has been going on for some time.  He reports that he may have prediabetes.  He does smoke as well.  He has not been abnormally sweating elsewhere other than the feet.              Patient History   Medical History[1]  Surgical History[2]  Family History[3]  Social History[4]    Physical Exam   ED Triage Vitals [06/06/25 0732]   Temperature Heart Rate Respirations BP   36.5 °C (97.7 °F) 85 17 127/82      Pulse Ox Temp Source Heart Rate Source Patient Position   100 % Temporal Monitor Sitting      BP Location FiO2 (%)     Left arm --       Physical Exam  HENT:      Head: Normocephalic.   Eyes:      General: No scleral icterus.  Neurological:      Mental Status: He is alert.      Comments: Motor and sensation is normal in the feet.           ED Course & MDM   Diagnoses as of 06/06/25 0808   Hyperhidrosis of feet                 No data recorded                                 Medical Decision Making  The sweating is localized to his feet only.  My suspicion for infection as etiology of symptoms is very low.  Sugar was checked and this is unremarkable.  Patient advised to follow-up with primary care physician as well as advised of possibility of obtaining Botox injections for hyperhidrosis.  Parts of this chart were completed with dictation software, please excuse any errors in transcription.        Procedure  Procedures         [1] No past medical history on file.  [2] No past surgical history on file.  [3]   Family History  Problem Relation Name Age of Onset    Colon cancer Neg Hx     [4]   Social History  Tobacco Use    Smoking status: Every Day     Current packs/day: 0.25     Types: Cigarettes    Smokeless tobacco: Never    Tobacco comments:     Working to stop. Down to under 1/4 ppd   Vaping Use    Vaping status: Never Used   Substance Use Topics    Alcohol use: Yes     Comment: OCASSIONAL    Drug use: Never        Reji A  MD Stefan  06/06/25 0832

## 2025-06-06 NOTE — PROGRESS NOTES
Subjective   Patient ID: Cameron Agarwal is a 44 y.o. male who presents for Excessive Sweating.  Excessive Sweating    The patient is a 45 yo Male with her for the management of his hyperhidrosis.    A review of system was completed.  All systems were reviewed and were normal, except for the ones that are listed in the HPI.    Objective   Physical Exam  Constitutional:       Appearance: Normal appearance.   HENT:      Head: Normocephalic and atraumatic.      Right Ear: Tympanic membrane, ear canal and external ear normal.      Left Ear: Tympanic membrane, ear canal and external ear normal.      Nose: Nose normal.      Mouth/Throat:      Mouth: Mucous membranes are moist.      Pharynx: Oropharynx is clear.   Eyes:      Extraocular Movements: Extraocular movements intact.      Conjunctiva/sclera: Conjunctivae normal.      Pupils: Pupils are equal, round, and reactive to light.   Cardiovascular:      Rate and Rhythm: Normal rate and regular rhythm.      Pulses: Normal pulses.   Pulmonary:      Effort: Pulmonary effort is normal.      Breath sounds: Normal breath sounds.   Abdominal:      General: Abdomen is flat. Bowel sounds are normal.      Palpations: Abdomen is soft.   Musculoskeletal:         General: Normal range of motion.      Cervical back: Normal range of motion and neck supple.   Skin:     General: Skin is warm.      Comments: Dry peeling skin of the soles bilaterally.    Neurological:      General: No focal deficit present.      Mental Status: He is alert and oriented to person, place, and time. Mental status is at baseline.   Psychiatric:         Mood and Affect: Mood normal.         Behavior: Behavior normal.         Thought Content: Thought content normal.         Judgment: Judgment normal.     Assessment/Plan   Problem List Items Addressed This Visit       Hyperhidrosis - Primary    -Drysol solution at bedtime started today.  -Ketoconazole cream BID for 1 month started.  -Dermatologist referral done.           Relevant Medications    aluminum chloride (Drysol Dab-O-Matic) 20 % external solution    ketoconazole (NIZOral) 2 % cream    Other Relevant Orders    Referral to Dermatology    Patient to return to office in 2-4 weeks.

## 2025-06-06 NOTE — ASSESSMENT & PLAN NOTE
-Drysol solution at bedtime started today.  -Ketoconazole cream BID for 1 month started.  -Dermatologist referral done.

## 2025-06-06 NOTE — ED TRIAGE NOTES
Pt states his feet are wet, states that he thinks he is prediabetic and has had increased thirst lately as well.

## 2025-06-06 NOTE — Clinical Note
Cmaeron Agarwal was seen and treated in our emergency department on 6/6/2025.  He may return to work on 06/07/2025.       If you have any questions or concerns, please don't hesitate to call.      Reji Aviles MD

## 2025-06-11 ENCOUNTER — OFFICE VISIT (OUTPATIENT)
Dept: PODIATRY | Facility: CLINIC | Age: 45
End: 2025-06-11
Payer: COMMERCIAL

## 2025-06-11 DIAGNOSIS — R61 HYPERHIDROSIS: ICD-10-CM

## 2025-06-11 PROCEDURE — 4004F PT TOBACCO SCREEN RCVD TLK: CPT | Performed by: PODIATRIST

## 2025-06-11 PROCEDURE — 99202 OFFICE O/P NEW SF 15 MIN: CPT | Performed by: PODIATRIST

## 2025-06-11 RX ORDER — ALUMINUM CHLORIDE 20 %
SOLUTION, NON-ORAL TOPICAL
Qty: 60 ML | Refills: 3 | Status: SHIPPED | OUTPATIENT
Start: 2025-06-11

## 2025-06-11 NOTE — PROGRESS NOTES
"History Of Present Illness  Cameron Agarwal is a 44 y.o. male presenting with chief complaint of bilateral hyperhidrosis.  His primary care did prescribe him Drysol which he states [facility] pharmacy did not have.  He was referred to dermatology but made an appointment here when talking to central scheduling.  He is wondering what can permanently be done for this.     Past Medical History  He has no past medical history on file.    Surgical History  He has no past surgical history on file.     Social History  He reports that he has been smoking cigarettes. He has never used smokeless tobacco. He reports current alcohol use. He reports that he does not use drugs.    Family History  Family History[1]     Allergies  Pollen extracts    Medications  Current Medications[2]    Review of Systems    REVIEW OF SYSTEMS  GENERAL:  Negative for malaise, significant weight loss, fever      Objective:   Vasc: DP and PT pulses are palpable bilateral.  CFT is less than 3 seconds bilateral.  Skin temperature is warm to cool proximal to distal bilateral.      Neuro:  Light touch is intact to the foot bilateral.     Derm: Clear hyperhidrosis noted to bilateral feet.  No evidence of tinea pedis      Assessment/Plan     Diagnoses and all orders for this visit:  Hyperhidrosis  -     aluminum chloride (Drysol Dab-O-Matic) 20 % external solution; Apply daily until dryness achieved then 2-3x/week prn      The patient evaluated and examined.  He does understand that the treatment for hyperhidrosis is either prescription Drysol or over-the-counter carpe.  He states \"I do not think this will work\".  I do feel he should try the Drysol first.  I have called this into [facility] mail service to see if they have more availability.  The patient states he is looking for Botox injections.  He understands this would be done through dermatology if this is a service they perform.  I encouraged him to call central scheduling and fulfil this referral.  He " understands he should make sure that this a service they perform.       Alessia Barrios DPM       [1]   Family History  Problem Relation Name Age of Onset    Colon cancer Neg Hx     [2]   Current Outpatient Medications   Medication Sig Dispense Refill    DULoxetine (Cymbalta) 20 mg DR capsule Take 1 capsule (20 mg) by mouth once daily. Do not crush or chew. 30 capsule 2    ergocalciferol (Vitamin D-2) 1250 mcg (50,000 units) capsule Take 1 capsule (1.25 mg) by mouth 1 (one) time per week. 12 capsule 0    ketoconazole (NIZOral) 2 % cream Apply topically once daily. 60 g 2    losartan (Cozaar) 100 mg tablet Take 1 tablet (100 mg) by mouth once daily. 90 tablet 3    nabumetone (Relafen) 500 mg tablet Take 2 tablets (1,000 mg) by mouth 2 times a day. 120 tablet 1    aluminum chloride (Drysol Dab-O-Matic) 20 % external solution Apply daily until dryness achieved then 2-3x/week prn 60 mL 3     No current facility-administered medications for this visit.

## 2025-07-11 PROCEDURE — 99282 EMERGENCY DEPT VISIT SF MDM: CPT

## 2025-07-11 PROCEDURE — 99281 EMR DPT VST MAYX REQ PHY/QHP: CPT

## 2025-07-12 ENCOUNTER — HOSPITAL ENCOUNTER (EMERGENCY)
Facility: HOSPITAL | Age: 45
Discharge: HOME | End: 2025-07-12
Payer: COMMERCIAL

## 2025-07-12 VITALS
SYSTOLIC BLOOD PRESSURE: 108 MMHG | WEIGHT: 158 LBS | DIASTOLIC BLOOD PRESSURE: 75 MMHG | RESPIRATION RATE: 18 BRPM | BODY MASS INDEX: 21.4 KG/M2 | OXYGEN SATURATION: 95 % | HEIGHT: 72 IN | HEART RATE: 79 BPM | TEMPERATURE: 97.8 F

## 2025-07-12 DIAGNOSIS — R73.03 PREDIABETES: Primary | ICD-10-CM

## 2025-07-12 LAB — GLUCOSE BLD MANUAL STRIP-MCNC: 119 MG/DL (ref 74–99)

## 2025-07-12 PROCEDURE — 82947 ASSAY GLUCOSE BLOOD QUANT: CPT

## 2025-07-12 ASSESSMENT — PAIN - FUNCTIONAL ASSESSMENT: PAIN_FUNCTIONAL_ASSESSMENT: 0-10

## 2025-07-12 ASSESSMENT — PAIN DESCRIPTION - PROGRESSION: CLINICAL_PROGRESSION: NOT CHANGED

## 2025-07-12 ASSESSMENT — PAIN SCALES - GENERAL: PAINLEVEL_OUTOF10: 0 - NO PAIN

## 2025-07-12 NOTE — ED PROVIDER NOTES
HPI   Chief Complaint   Patient presents with    Hyperglycemia       Patient is a 45-year-old male presenting with concerns for possible hyperglycemia.  Patient has a history of prediabetes.  He is not on any medication for hyperglycemia.  Endorses intermittent tingling of the lower extremities has been onset for several months.  Has a PCP appointment in 2 days.  Patient denies fevers, chills, cough, sore throat, runny nose, chest pain, shortness of breath, abdominal pain, nausea, vomiting, diarrhea or urinary complaints.              Patient History   Medical History[1]  Surgical History[2]  Family History[3]  Social History[4]    Physical Exam   ED Triage Vitals [07/12/25 0044]   Temperature Heart Rate Respirations BP   36.6 °C (97.8 °F) 79 18 108/75      Pulse Ox Temp Source Heart Rate Source Patient Position   95 % Oral Monitor Sitting      BP Location FiO2 (%)     Right arm --       Physical Exam  Vitals and nursing note reviewed.   Constitutional:       Appearance: He is well-developed.      Comments: Awake, laying in examination chair   HENT:      Head: Normocephalic and atraumatic.      Nose: Nose normal.      Mouth/Throat:      Mouth: Mucous membranes are moist.      Pharynx: Oropharynx is clear.   Eyes:      Extraocular Movements: Extraocular movements intact.      Conjunctiva/sclera: Conjunctivae normal.      Pupils: Pupils are equal, round, and reactive to light.   Cardiovascular:      Rate and Rhythm: Normal rate and regular rhythm.      Pulses: Normal pulses.      Heart sounds: Normal heart sounds. No murmur heard.  Pulmonary:      Effort: Pulmonary effort is normal. No respiratory distress.      Breath sounds: Normal breath sounds.   Abdominal:      General: Abdomen is flat.      Palpations: Abdomen is soft.      Tenderness: There is no abdominal tenderness.   Musculoskeletal:         General: No swelling. Normal range of motion.      Cervical back: Normal range of motion and neck supple.   Skin:      General: Skin is warm and dry.      Capillary Refill: Capillary refill takes less than 2 seconds.      Comments: DP and PT pulse in the bilateral lower extremities strong and intact   Neurological:      General: No focal deficit present.      Mental Status: He is alert and oriented to person, place, and time.   Psychiatric:         Mood and Affect: Mood normal.         Behavior: Behavior normal.           ED Course & MDM   Diagnoses as of 07/12/25 1602   Prediabetes                 No data recorded     Davion Coma Scale Score: 15 (07/12/25 0046 : Aidee Chow RN)                           Medical Decision Making  Patient is a 45-year-old male presenting with possible hyperglycemia.  Point-of-care glucose ordered.  Conditions considered include but are not limited to: Anxiety, hyperglycemia.    Point of care glucose is normal.  Patient was given diabetic education.  I believe this patient is at low risk for complication, and a disposition of discharge is acceptable.  Return to the Emergency Department if new or worsening symptoms including headache, fever, chills, chest pain, shortness of breath, syncope, near syncope, abdominal pain, nausea, vomiting,  diarrhea, or worsening pain.  Patient is agreeable to disposition of discharge and to follow with primary care at scheduled appointment.    Portions of this note made with Dragon software, please be mindful of potential grammatical errors.      Labs Reviewed   POCT GLUCOSE - Abnormal       Result Value    POCT Glucose 119 (*)    POCT GLUCOSE METER         Procedure  Procedures       [1] No past medical history on file.  [2] No past surgical history on file.  [3]   Family History  Problem Relation Name Age of Onset    Colon cancer Neg Hx     [4]   Social History  Tobacco Use    Smoking status: Every Day     Current packs/day: 0.25     Types: Cigarettes    Smokeless tobacco: Never    Tobacco comments:     Working to stop. Down to under 1/4 ppd   Vaping Use    Vaping  status: Never Used   Substance Use Topics    Alcohol use: Yes     Comment: OCASSIONAL    Drug use: Never        Delfino Rodriguez PA-C  07/12/25 8433

## 2025-07-12 NOTE — ED TRIAGE NOTES
Pt ambulatory into triage concerned he's experiencing hyperglycemia. Pt states he just recently got dx with prediabetes and wanted to ensure he wasn't experiencing high blood sugar. Pt states he's been drinking water all day at work but wants to see if he's also dehydrated because he's been having some cramping in his legs. Pt A/Ox4.

## 2025-07-12 NOTE — Clinical Note
Cameron Agarwal was seen and treated in our emergency department on 7/11/2025.  He may return to work on 07/15/2025.       If you have any questions or concerns, please don't hesitate to call.      Delfino Rodriguez PA-C

## 2025-07-12 NOTE — DISCHARGE INSTRUCTIONS
Please monitor your sugar.  Follow with your primary care provider.  They would likely be prescribing you a glucose monitor if you ask them.  I did also refer you to endocrinology.    Be sure to take all medications, over the counter medications or prescription medications only as directed.    Be sure to follow up as directed in 1-2 days. All of the details of your follow up instructions are detailed in the follow up section of this packet.    If you are being discharged with any pains medications or muscle relaxers (norco, Vicodin, hydrocodone products, Percocet, oxycodone products, flexeril, cyclobenzaprine, robaxin, norflex, brand or generic, or any other pain controlling medications with the exception of Ibuprofen and regular Tylenol, do not drive or operate machinery, climb ladders or participate in any activity that could potentially put yourself or others at risk should you get dizzy, or be/feel impaired at all.    Return to emergency room without delay for ANY new or worsening pains or for any other symptoms or concerns. Return with worsening pains, nausea, vomiting, trouble breathing, palpitations, shortness of breath, inability to pass stool or urine, loss of control of stool or urine, any numbness or tingling (that is not normal for you), uncontrolled fevers, the passing of blood or other material in stool or urine, rashes, pains or for any other symptoms or concerns you may have. You are always welcome to return to the ER at any time for any reason or for any other concerns you may have.

## 2025-07-14 ENCOUNTER — APPOINTMENT (OUTPATIENT)
Dept: PRIMARY CARE | Facility: CLINIC | Age: 45
End: 2025-07-14
Payer: COMMERCIAL

## 2025-07-16 ENCOUNTER — HOSPITAL ENCOUNTER (EMERGENCY)
Facility: HOSPITAL | Age: 45
Discharge: HOME | End: 2025-07-16
Payer: COMMERCIAL

## 2025-07-16 VITALS
RESPIRATION RATE: 18 BRPM | DIASTOLIC BLOOD PRESSURE: 88 MMHG | WEIGHT: 157.3 LBS | BODY MASS INDEX: 21.31 KG/M2 | OXYGEN SATURATION: 100 % | HEIGHT: 72 IN | HEART RATE: 78 BPM | SYSTOLIC BLOOD PRESSURE: 141 MMHG | TEMPERATURE: 98.4 F

## 2025-07-16 DIAGNOSIS — M79.10 MYALGIA: Primary | ICD-10-CM

## 2025-07-16 DIAGNOSIS — R63.1 POLYDIPSIA: ICD-10-CM

## 2025-07-16 DIAGNOSIS — R53.83 OTHER FATIGUE: ICD-10-CM

## 2025-07-16 DIAGNOSIS — R61 HYPERHIDROSIS: ICD-10-CM

## 2025-07-16 LAB
ALBUMIN SERPL BCP-MCNC: 4.7 G/DL (ref 3.4–5)
ALP SERPL-CCNC: 59 U/L (ref 33–120)
ALT SERPL W P-5'-P-CCNC: 16 U/L (ref 10–52)
ANION GAP SERPL CALCULATED.3IONS-SCNC: 13 MMOL/L (ref 10–20)
AST SERPL W P-5'-P-CCNC: 26 U/L (ref 9–39)
BASOPHILS # BLD AUTO: 0.02 X10*3/UL (ref 0–0.1)
BASOPHILS NFR BLD AUTO: 0.4 %
BILIRUB SERPL-MCNC: 0.4 MG/DL (ref 0–1.2)
BUN SERPL-MCNC: 6 MG/DL (ref 6–23)
CALCIUM SERPL-MCNC: 9.6 MG/DL (ref 8.6–10.3)
CHLORIDE SERPL-SCNC: 104 MMOL/L (ref 98–107)
CO2 SERPL-SCNC: 26 MMOL/L (ref 21–32)
CREAT SERPL-MCNC: 0.74 MG/DL (ref 0.5–1.3)
EGFRCR SERPLBLD CKD-EPI 2021: >90 ML/MIN/1.73M*2
EOSINOPHIL # BLD AUTO: 0.22 X10*3/UL (ref 0–0.7)
EOSINOPHIL NFR BLD AUTO: 4.3 %
ERYTHROCYTE [DISTWIDTH] IN BLOOD BY AUTOMATED COUNT: 14.5 % (ref 11.5–14.5)
GLUCOSE SERPL-MCNC: 103 MG/DL (ref 74–99)
HCT VFR BLD AUTO: 43.2 % (ref 41–52)
HGB BLD-MCNC: 14.7 G/DL (ref 13.5–17.5)
IMM GRANULOCYTES # BLD AUTO: 0.01 X10*3/UL (ref 0–0.7)
IMM GRANULOCYTES NFR BLD AUTO: 0.2 % (ref 0–0.9)
LYMPHOCYTES # BLD AUTO: 1.86 X10*3/UL (ref 1.2–4.8)
LYMPHOCYTES NFR BLD AUTO: 36.3 %
MCH RBC QN AUTO: 28.5 PG (ref 26–34)
MCHC RBC AUTO-ENTMCNC: 34 G/DL (ref 32–36)
MCV RBC AUTO: 84 FL (ref 80–100)
MONOCYTES # BLD AUTO: 0.3 X10*3/UL (ref 0.1–1)
MONOCYTES NFR BLD AUTO: 5.9 %
NEUTROPHILS # BLD AUTO: 2.71 X10*3/UL (ref 1.2–7.7)
NEUTROPHILS NFR BLD AUTO: 52.9 %
NRBC BLD-RTO: 0 /100 WBCS (ref 0–0)
PLATELET # BLD AUTO: 288 X10*3/UL (ref 150–450)
POTASSIUM SERPL-SCNC: 3.9 MMOL/L (ref 3.5–5.3)
PROT SERPL-MCNC: 7.1 G/DL (ref 6.4–8.2)
RBC # BLD AUTO: 5.15 X10*6/UL (ref 4.5–5.9)
SODIUM SERPL-SCNC: 139 MMOL/L (ref 136–145)
TSH SERPL-ACNC: 2.1 MIU/L (ref 0.44–3.98)
WBC # BLD AUTO: 5.1 X10*3/UL (ref 4.4–11.3)

## 2025-07-16 PROCEDURE — 85025 COMPLETE CBC W/AUTO DIFF WBC: CPT

## 2025-07-16 PROCEDURE — 36415 COLL VENOUS BLD VENIPUNCTURE: CPT

## 2025-07-16 PROCEDURE — 84443 ASSAY THYROID STIM HORMONE: CPT

## 2025-07-16 PROCEDURE — 99283 EMERGENCY DEPT VISIT LOW MDM: CPT

## 2025-07-16 PROCEDURE — 84075 ASSAY ALKALINE PHOSPHATASE: CPT

## 2025-07-16 ASSESSMENT — PAIN DESCRIPTION - PAIN TYPE: TYPE: ACUTE PAIN

## 2025-07-16 ASSESSMENT — PAIN SCALES - GENERAL: PAINLEVEL_OUTOF10: 8

## 2025-07-16 ASSESSMENT — PAIN DESCRIPTION - LOCATION: LOCATION: GENERALIZED

## 2025-07-16 ASSESSMENT — PAIN DESCRIPTION - DESCRIPTORS: DESCRIPTORS: ACHING

## 2025-07-16 ASSESSMENT — PAIN - FUNCTIONAL ASSESSMENT: PAIN_FUNCTIONAL_ASSESSMENT: 0-10

## 2025-07-16 NOTE — ED TRIAGE NOTES
Pt to ED for continued generalized body aches, feet sweating, decreased appetite, increased fatigue, increased thirst. He feels unsettled. He was seen in ED last Friday for similar and just isn't feeling right.

## 2025-07-17 NOTE — ED PROVIDER NOTES
HPI   Chief Complaint   Patient presents with    Generalized Body Aches     Pt to ED for continued generalized body aches, feet sweating, decreased appetite, increased fatigue, increased thirst. He feels unsettled. He was seen in ED last Friday for similar and just isn't feeling right.        HPI  Patient is a 45-year-old male who presents to ED for a constellation of symptoms.  Patient complains of myalgias, sweaty feet, decreased appetite, fatigue, increased thirst, general anxiety.  Patient was seen in the ED last Friday, blood sugar was checked and patient was discharged in stable condition.  Patient states he would like to have blood work done today to make sure he is okay.  He denies any fever or chills.  Denies any headache or dizziness.  Denies any chest pain or shortness of breath.  Denies any abdominal pain or NVD.  No recent hospitalizations or surgeries.  No recent travel or sick contacts.      Patient History   Medical History[1]  Surgical History[2]  Family History[3]  Social History[4]    Physical Exam   ED Triage Vitals [07/16/25 1856]   Temperature Heart Rate Respirations BP   36.9 °C (98.4 °F) (!) 102 18 141/88      Pulse Ox Temp Source Heart Rate Source Patient Position   100 % Temporal -- --      BP Location FiO2 (%)     -- --       Physical Exam  Vitals reviewed.   Constitutional:       General: She is not in acute distress.     Appearance: Normal appearance. She is not ill-appearing.   HENT:      Head: Normocephalic and atraumatic.   Eyes:      Extraocular Movements: Extraocular movements intact.   Cardiovascular:      Rate and Rhythm: Normal rate and regular rhythm.      Heart sounds: Normal heart sounds.   Pulmonary:      Effort: Pulmonary effort is normal.      Breath sounds: Normal breath sounds.   Abdominal:      Palpations: Abdomen is soft.      Tenderness: There is no abdominal tenderness.   Musculoskeletal:         General: Normal range of motion.      Cervical back: Normal range of  motion and neck supple.   Skin:     General: Skin is warm and dry.   Neurological:      General: No focal deficit present.      Mental Status: She is alert and oriented to person, place, and time.   Psychiatric:         Mood and Affect: Mood normal.         Behavior: Behavior normal.    ED Course & MDM   Diagnoses as of 07/17/25 0817   Myalgia   Hyperhidrosis   Other fatigue   Polydipsia                 No data recorded     [place] Coma Scale Score: 15 (07/16/25 1859 : Manasa Key RN)                           Medical Decision Making  Parts of this chart have been completed using voice recognition software. Please excuse any errors of transcription.  My thought process and reason for plan has been formulated from the time that I saw the patient until the time of disposition and is not specific to one specific moment during their visit and furthermore my MDM encompasses this entire chart and not only this text box.    HPI:   A medically appropriate HPI was obtained, outlined above.    Cameron Agarwal is a  45 y.o. male    Chief Complaint   Patient presents with    Generalized Body Aches     Pt to ED for continued generalized body aches, feet sweating, decreased appetite, increased fatigue, increased thirst. He feels unsettled. He was seen in ED last Friday for similar and just isn't feeling right.        Medical History[5]    Surgical History[6]    Social History[7]    Family History[8]    Allergies[9]    Current Outpatient Medications   Medication Instructions    aluminum chloride (Drysol Dab-O-Matic) 20 % external solution Apply daily until dryness achieved then 2-3x/week prn    DULoxetine (CYMBALTA) 20 mg, oral, Daily, Do not crush or chew.    ergocalciferol (VITAMIN D-2) 1.25 mg, oral, Weekly    ketoconazole (NIZOral) 2 % cream Topical, Daily    losartan (COZAAR) 100 mg, oral, Daily    nabumetone (RELAFEN) 1,000 mg, oral, 2 times daily   for details    Exam:   Patient Vitals for the past 24 hrs:   BP Temp  Temp src Pulse Resp SpO2 Height Weight   07/16/25 2101 -- -- -- 78 -- -- -- --   07/16/25 1856 141/88 36.9 °C (98.4 °F) Temporal (!) 102 18 100 % 1.829 m (6') 71.4 kg (157 lb 4.8 oz)       A medically appropriate exam performed, outlined above, given the known history and presentation.    EKG/Cardiac monitor:   If EKG was done and, it was interpreted by attending physician, see their note for ED course for more detail.    Medications given during visit:  Medications - No data to display     Diagnostic/tests:  Labs Reviewed   COMPREHENSIVE METABOLIC PANEL - Abnormal       Result Value    Glucose 103 (*)     Sodium 139      Potassium 3.9      Chloride 104      Bicarbonate 26      Anion Gap 13      Urea Nitrogen 6      Creatinine 0.74      eGFR >90      Calcium 9.6      Albumin 4.7      Alkaline Phosphatase 59      Total Protein 7.1      AST 26      Bilirubin, Total 0.4      ALT 16     TSH WITH REFLEX TO FREE T4 IF ABNORMAL - Normal    Thyroid Stimulating Hormone 2.10      Narrative:     TSH testing is performed using different testing methodology at [facility] than at other [facility]. Direct result comparisons should only be made within the same method.     CBC WITH AUTO DIFFERENTIAL    WBC 5.1      nRBC 0.0      RBC 5.15      Hemoglobin 14.7      Hematocrit 43.2      MCV 84      MCH 28.5      MCHC 34.0      RDW 14.5      Platelets 288      Neutrophils % 52.9      Immature Granulocytes %, Automated 0.2      Lymphocytes % 36.3      Monocytes % 5.9      Eosinophils % 4.3      Basophils % 0.4      Neutrophils Absolute 2.71      Immature Granulocytes Absolute, Automated 0.01      Lymphocytes Absolute 1.86      Monocytes Absolute 0.30      Eosinophils Absolute 0.22      Basophils Absolute 0.02          No orders to display          MDM Summary:  Lab work today is essentially unremarkable.  Advised patient to follow-up with primary care provider for further management.  Return precautions were  discussed.    We have discussed the diagnosis and risks, and we agree with discharging home to follow-up with appropriate physician as directed. We also discussed returning to the Emergency Department immediately if new or worsening symptoms occur. We have discussed the symptoms which are most concerning that necessitate immediate return. Pt symptoms have been well controlled here and the patient is safe for discharge with appropriate outpatient follow up. The patient has verbalized understanding to return to ER without delay for new or worsening pains or for any other symptoms or concerns. I utilized the discharge clinical management tool provided [employer] to help estimate risk of negative outcome for this patient.        Disposition:  ED Prescriptions    None         All of the patient's questions were answered to the best of my ability. Patient states understanding that they have been screened for an emergency today and we have not found any etiology of symptoms that requires emergent treatment or admission to the hospital at this point. They understand that they have not had definitive care day and require follow-up for treatment of their condition. They also state understanding that they may have an emergent condition that may potentially have not of detected at this visit and they must return to the emergency department if they develop any worsening of symptoms or new complaints.       Procedure  Procedures       [1] History reviewed. No pertinent past medical history.  [2] History reviewed. No pertinent surgical history.  [3]   Family History  Problem Relation Name Age of Onset    Colon cancer Neg Hx     [4]   Social History  Tobacco Use    Smoking status: Every Day     Current packs/day: 0.25     Types: Cigarettes    Smokeless tobacco: Never    Tobacco comments:     Working to stop. Down to under 1/4 ppd   Vaping Use    Vaping status: Never Used   Substance Use Topics    Alcohol use: Yes      Comment: occasional    Drug use: Never   [5] History reviewed. No pertinent past medical history.  [6] History reviewed. No pertinent surgical history.  [7]   Social History  Tobacco Use    Smoking status: Every Day     Current packs/day: 0.25     Types: Cigarettes    Smokeless tobacco: Never    Tobacco comments:     Working to stop. Down to under 1/4 ppd   Vaping Use    Vaping status: Never Used   Substance Use Topics    Alcohol use: Yes     Comment: occasional    Drug use: Never   [8]   Family History  Problem Relation Name Age of Onset    Colon cancer Neg Hx     [9]   Allergies  Allergen Reactions    Pollen Extracts Runny nose        Too Elkins PA-C  07/17/25 0819

## 2025-07-18 ENCOUNTER — HOSPITAL ENCOUNTER (EMERGENCY)
Facility: HOSPITAL | Age: 45
Discharge: HOME | End: 2025-07-18
Payer: COMMERCIAL

## 2025-07-18 VITALS
RESPIRATION RATE: 16 BRPM | HEIGHT: 72 IN | DIASTOLIC BLOOD PRESSURE: 81 MMHG | WEIGHT: 158 LBS | OXYGEN SATURATION: 99 % | TEMPERATURE: 97 F | HEART RATE: 75 BPM | SYSTOLIC BLOOD PRESSURE: 120 MMHG | BODY MASS INDEX: 21.4 KG/M2

## 2025-07-18 DIAGNOSIS — Z71.6 ENCOUNTER FOR SMOKING CESSATION COUNSELING: ICD-10-CM

## 2025-07-18 DIAGNOSIS — L74.513 HYPERHIDROSIS OF FEET: ICD-10-CM

## 2025-07-18 DIAGNOSIS — M79.10 MYALGIA: Primary | ICD-10-CM

## 2025-07-18 PROCEDURE — 2500000001 HC RX 250 WO HCPCS SELF ADMINISTERED DRUGS (ALT 637 FOR MEDICARE OP): Performed by: PHYSICIAN ASSISTANT

## 2025-07-18 PROCEDURE — 99282 EMERGENCY DEPT VISIT SF MDM: CPT

## 2025-07-18 RX ORDER — ACETAMINOPHEN 325 MG/1
975 TABLET ORAL ONCE
Status: COMPLETED | OUTPATIENT
Start: 2025-07-18 | End: 2025-07-18

## 2025-07-18 RX ORDER — ACETAMINOPHEN 500 MG
1000 TABLET ORAL EVERY 8 HOURS PRN
Qty: 90 TABLET | Refills: 0 | Status: SHIPPED | OUTPATIENT
Start: 2025-07-18 | End: 2025-08-02

## 2025-07-18 RX ORDER — IBUPROFEN 200 MG
1 TABLET ORAL EVERY 24 HOURS
Qty: 30 PATCH | Refills: 0 | Status: SHIPPED | OUTPATIENT
Start: 2025-07-18 | End: 2025-08-17

## 2025-07-18 RX ADMIN — ACETAMINOPHEN 975 MG: 325 TABLET ORAL at 09:59

## 2025-07-18 ASSESSMENT — PAIN DESCRIPTION - PAIN TYPE: TYPE: ACUTE PAIN

## 2025-07-18 ASSESSMENT — PAIN SCALES - GENERAL: PAINLEVEL_OUTOF10: 7

## 2025-07-18 ASSESSMENT — PAIN DESCRIPTION - LOCATION: LOCATION: LEG

## 2025-07-18 ASSESSMENT — PAIN - FUNCTIONAL ASSESSMENT: PAIN_FUNCTIONAL_ASSESSMENT: 0-10

## 2025-07-18 ASSESSMENT — PAIN DESCRIPTION - DESCRIPTORS: DESCRIPTORS: ACHING

## 2025-07-18 ASSESSMENT — PAIN DESCRIPTION - FREQUENCY: FREQUENCY: CONSTANT/CONTINUOUS

## 2025-07-18 ASSESSMENT — PAIN DESCRIPTION - ORIENTATION: ORIENTATION: RIGHT;LEFT

## 2025-07-18 NOTE — ED PROVIDER NOTES
HPI   Chief Complaint   Patient presents with    Leg Pain       Is a 45-year-old male who complains of bilateral lower leg pain and cramping started earlier this week he has been seen about 7 times for similar symptoms this year he has had workup many of the times that were normal.  He has no risk factors for clot he denies any injury denies any exertion.  He asked multiple times if this could be related to being prediabetic.  He has not taken anything for his pain.  He does admit he is a smoker and trying to quit he has a follow-up with his primary care next week.  He also complains of excessive sweating of his feet he has also been seen for this in the past and was recommended to follow-up outpatient and possible Botox or medications which she states he has followed up for this before outside of the ER.              Patient History   Medical History[1]  Surgical History[2]  Family History[3]  Social History[4]    Physical Exam   ED Triage Vitals [07/18/25 0907]   Temperature Heart Rate Respirations BP   36.1 °C (97 °F) 75 16 132/84      Pulse Ox Temp Source Heart Rate Source Patient Position   95 % Temporal Monitor --      BP Location FiO2 (%)     -- --       Physical Exam  Vitals and nursing note reviewed.   Constitutional:       General: He is not in acute distress.     Appearance: Normal appearance. He is normal weight. He is not ill-appearing, toxic-appearing or diaphoretic.   HENT:      Head: Normocephalic and atraumatic.      Right Ear: Tympanic membrane, ear canal and external ear normal.      Left Ear: Tympanic membrane, ear canal and external ear normal.      Nose: Nose normal.      Mouth/Throat:      Mouth: Mucous membranes are moist.     Eyes:      Extraocular Movements: Extraocular movements intact.      Conjunctiva/sclera: Conjunctivae normal.      Pupils: Pupils are equal, round, and reactive to light.       Cardiovascular:      Rate and Rhythm: Normal rate.      Pulses: Normal pulses.      Heart  sounds: Normal heart sounds. No murmur heard.  Pulmonary:      Effort: Pulmonary effort is normal. No respiratory distress.      Breath sounds: No stridor.   Abdominal:      General: There is no distension.      Tenderness: There is no guarding or rebound.     Musculoskeletal:         General: No swelling, tenderness, deformity or signs of injury. Normal range of motion.      Cervical back: Normal range of motion.     Skin:     General: Skin is warm.      Capillary Refill: Capillary refill takes less than 2 seconds.      Coloration: Skin is not jaundiced.      Findings: No bruising or rash.     Neurological:      General: No focal deficit present.      Mental Status: He is alert and oriented to person, place, and time. Mental status is at baseline.      Cranial Nerves: No cranial nerve deficit.      Sensory: No sensory deficit.      Motor: No weakness.     Psychiatric:         Mood and Affect: Mood normal.         Behavior: Behavior normal.         Thought Content: Thought content normal.         Judgment: Judgment normal.           ED Course & MDM   Diagnoses as of 07/18/25 1754   Myalgia   Encounter for smoking cessation counseling   Hyperhidrosis of feet                 No data recorded     [place] Coma Scale Score: 15 (07/18/25 0907 : Bhavesh Devlin, MELISSA)                           Medical Decision Making  Differential diagnosis of myalgia, smoking cessation, hyperhidrosis of the feet    Considered imaging and labs however patient has had them multiple times for this with no significant findings discussed that he has a follow-up with his primary care physician next week and I will defer any testing to this nonemergent follow-up.  He is interested in smoking cessation I offered him patches and he will take these.        Procedure  Procedures       [1] No past medical history on file.  [2] No past surgical history on file.  [3]   Family History  Problem Relation Name Age of Onset    Colon cancer Neg Hx     [4]    Social History  Tobacco Use    Smoking status: Every Day     Current packs/day: 0.25     Types: Cigarettes    Smokeless tobacco: Never    Tobacco comments:     Working to stop. Down to under 1/4 ppd   Vaping Use    Vaping status: Never Used   Substance Use Topics    Alcohol use: Yes     Comment: occasional    Drug use: Never        Xu Ruffin PA-C  07/18/25 7462

## 2025-07-18 NOTE — Clinical Note
Cameron Agarwal was seen and treated in our emergency department on 7/18/2025.  He may return to work on 07/19/2025.       If you have any questions or concerns, please don't hesitate to call.      Xu Ruffin PA-C

## 2025-07-18 NOTE — ED TRIAGE NOTES
Patient c/o bilateral lower leg pain/cramping that started earlier this week. Pt was seen previously for this but states the pain is worse.

## 2025-07-20 DIAGNOSIS — E55.9 VITAMIN D DEFICIENCY: ICD-10-CM

## 2025-07-21 RX ORDER — ERGOCALCIFEROL 1.25 MG/1
1.25 CAPSULE ORAL
Qty: 12 CAPSULE | Refills: 0 | Status: SHIPPED | OUTPATIENT
Start: 2025-07-21

## 2025-07-22 ENCOUNTER — APPOINTMENT (OUTPATIENT)
Dept: PRIMARY CARE | Facility: CLINIC | Age: 45
End: 2025-07-22
Payer: COMMERCIAL

## 2025-07-24 ENCOUNTER — OFFICE VISIT (OUTPATIENT)
Dept: PRIMARY CARE | Facility: CLINIC | Age: 45
End: 2025-07-24
Payer: COMMERCIAL

## 2025-07-24 VITALS
BODY MASS INDEX: 21.02 KG/M2 | SYSTOLIC BLOOD PRESSURE: 108 MMHG | WEIGHT: 155 LBS | DIASTOLIC BLOOD PRESSURE: 71 MMHG | HEART RATE: 79 BPM | TEMPERATURE: 97.8 F

## 2025-07-24 DIAGNOSIS — L60.8 MELANONYCHIA STRIATA: ICD-10-CM

## 2025-07-24 DIAGNOSIS — L74.513 PRIMARY FOCAL HYPERHIDROSIS OF SOLES OF BOTH FEET: Primary | ICD-10-CM

## 2025-07-24 DIAGNOSIS — G62.9 POLYNEUROPATHY: ICD-10-CM

## 2025-07-24 PROCEDURE — 3074F SYST BP LT 130 MM HG: CPT | Performed by: INTERNAL MEDICINE

## 2025-07-24 PROCEDURE — 3078F DIAST BP <80 MM HG: CPT | Performed by: INTERNAL MEDICINE

## 2025-07-24 PROCEDURE — 99214 OFFICE O/P EST MOD 30 MIN: CPT | Performed by: INTERNAL MEDICINE

## 2025-07-24 RX ORDER — GABAPENTIN 100 MG/1
100 CAPSULE ORAL 2 TIMES DAILY
Qty: 180 CAPSULE | Refills: 1 | Status: SHIPPED | OUTPATIENT
Start: 2025-07-24 | End: 2026-01-20

## 2025-07-24 ASSESSMENT — PAIN SCALES - GENERAL: PAINLEVEL_OUTOF10: 0-NO PAIN

## 2025-07-24 ASSESSMENT — ENCOUNTER SYMPTOMS
SHORTNESS OF BREATH: 0
COUGH: 0
POLYDIPSIA: 0
PALPITATIONS: 0
FEVER: 0
CHILLS: 0

## 2025-07-24 NOTE — PROGRESS NOTES
Subjective   Patient ID: Cameron Agarwal is a 45 y.o. male who presents for Follow-up.    PT presents for return to work FMLA. Was out of work for feet hyperhidrosis since June 6th. Cleared immediately for work today by me. No reason for continued out of work on this condition.  Release to work Monday.     He then told me he has diffuse tingling in his extremities, feet and hands most specifically without a pattern or localization to R or left side- mostly equal bilateral, but is more noticeable in feet then hands. No weakness. No hx of neck or spine disease.     FMLA reviewed and completed.          Review of Systems   Constitutional:  Negative for chills and fever.   Respiratory:  Negative for cough and shortness of breath.    Cardiovascular:  Negative for chest pain and palpitations.   Endocrine: Negative for polydipsia and polyuria.       Objective   /71 (BP Location: Right arm, Patient Position: Sitting, BP Cuff Size: Adult)   Pulse 79   Temp 36.6 °C (97.8 °F) (Temporal)   Wt 70.3 kg (155 lb)   BMI 21.02 kg/m²     Physical Exam  Constitutional:       Appearance: Normal appearance.     Neurological:      Mental Status: Cameron is alert.         Assessment/Plan   Assessment & Plan  Primary focal hyperhidrosis of soles of both feet         Polyneuropathy    Orders:    EMG & nerve conduction; Future    gabapentin (Neurontin) 100 mg capsule; Take 1 capsule (100 mg) by mouth 2 times a day.    Cathia striata

## 2025-07-27 ENCOUNTER — HOSPITAL ENCOUNTER (EMERGENCY)
Facility: HOSPITAL | Age: 45
Discharge: HOME | End: 2025-07-28
Attending: STUDENT IN AN ORGANIZED HEALTH CARE EDUCATION/TRAINING PROGRAM
Payer: COMMERCIAL

## 2025-07-27 DIAGNOSIS — R53.81 MALAISE: Primary | ICD-10-CM

## 2025-07-27 DIAGNOSIS — R68.2 DRY MOUTH: ICD-10-CM

## 2025-07-27 LAB
BASOPHILS # BLD AUTO: 0.06 X10*3/UL (ref 0–0.1)
BASOPHILS NFR BLD AUTO: 0.9 %
EOSINOPHIL # BLD AUTO: 0.35 X10*3/UL (ref 0–0.7)
EOSINOPHIL NFR BLD AUTO: 5 %
ERYTHROCYTE [DISTWIDTH] IN BLOOD BY AUTOMATED COUNT: 14.6 % (ref 11.5–14.5)
HCT VFR BLD AUTO: 44.1 % (ref 41–52)
HGB BLD-MCNC: 15 G/DL (ref 13.5–17.5)
IMM GRANULOCYTES # BLD AUTO: 0.01 X10*3/UL (ref 0–0.7)
IMM GRANULOCYTES NFR BLD AUTO: 0.1 % (ref 0–0.9)
LYMPHOCYTES # BLD AUTO: 3.24 X10*3/UL (ref 1.2–4.8)
LYMPHOCYTES NFR BLD AUTO: 46 %
MCH RBC QN AUTO: 28.6 PG (ref 26–34)
MCHC RBC AUTO-ENTMCNC: 34 G/DL (ref 32–36)
MCV RBC AUTO: 84 FL (ref 80–100)
MONOCYTES # BLD AUTO: 0.59 X10*3/UL (ref 0.1–1)
MONOCYTES NFR BLD AUTO: 8.4 %
NEUTROPHILS # BLD AUTO: 2.79 X10*3/UL (ref 1.2–7.7)
NEUTROPHILS NFR BLD AUTO: 39.6 %
NRBC BLD-RTO: 0 /100 WBCS (ref 0–0)
PLATELET # BLD AUTO: 277 X10*3/UL (ref 150–450)
RBC # BLD AUTO: 5.24 X10*6/UL (ref 4.5–5.9)
WBC # BLD AUTO: 7 X10*3/UL (ref 4.4–11.3)

## 2025-07-27 PROCEDURE — 80048 BASIC METABOLIC PNL TOTAL CA: CPT | Performed by: STUDENT IN AN ORGANIZED HEALTH CARE EDUCATION/TRAINING PROGRAM

## 2025-07-27 PROCEDURE — 36415 COLL VENOUS BLD VENIPUNCTURE: CPT | Performed by: STUDENT IN AN ORGANIZED HEALTH CARE EDUCATION/TRAINING PROGRAM

## 2025-07-27 PROCEDURE — 99284 EMERGENCY DEPT VISIT MOD MDM: CPT | Performed by: STUDENT IN AN ORGANIZED HEALTH CARE EDUCATION/TRAINING PROGRAM

## 2025-07-27 PROCEDURE — 85025 COMPLETE CBC W/AUTO DIFF WBC: CPT | Performed by: STUDENT IN AN ORGANIZED HEALTH CARE EDUCATION/TRAINING PROGRAM

## 2025-07-27 ASSESSMENT — LIFESTYLE VARIABLES
HAVE YOU EVER FELT YOU SHOULD CUT DOWN ON YOUR DRINKING: NO
EVER HAD A DRINK FIRST THING IN THE MORNING TO STEADY YOUR NERVES TO GET RID OF A HANGOVER: NO
EVER FELT BAD OR GUILTY ABOUT YOUR DRINKING: NO
HAVE PEOPLE ANNOYED YOU BY CRITICIZING YOUR DRINKING: NO
TOTAL SCORE: 0

## 2025-07-27 ASSESSMENT — PAIN - FUNCTIONAL ASSESSMENT: PAIN_FUNCTIONAL_ASSESSMENT: 0-10

## 2025-07-27 ASSESSMENT — PAIN SCALES - GENERAL: PAINLEVEL_OUTOF10: 0 - NO PAIN

## 2025-07-27 NOTE — Clinical Note
Cameron Agarwal was seen and treated in our emergency department on 7/27/2025.  Cameron may return to work on 07/28/2025.       If you have any questions or concerns, please don't hesitate to call.      Ekta Don, DO

## 2025-07-28 ENCOUNTER — OFFICE VISIT (OUTPATIENT)
Dept: PRIMARY CARE | Facility: CLINIC | Age: 45
End: 2025-07-28
Payer: COMMERCIAL

## 2025-07-28 VITALS — HEART RATE: 69 BPM | TEMPERATURE: 97.8 F | SYSTOLIC BLOOD PRESSURE: 117 MMHG | DIASTOLIC BLOOD PRESSURE: 82 MMHG

## 2025-07-28 VITALS
OXYGEN SATURATION: 100 % | HEART RATE: 74 BPM | TEMPERATURE: 98.6 F | WEIGHT: 161.3 LBS | BODY MASS INDEX: 21.85 KG/M2 | RESPIRATION RATE: 16 BRPM | HEIGHT: 72 IN | DIASTOLIC BLOOD PRESSURE: 83 MMHG | SYSTOLIC BLOOD PRESSURE: 128 MMHG

## 2025-07-28 DIAGNOSIS — J06.9 VIRAL URI: Primary | ICD-10-CM

## 2025-07-28 LAB
ANION GAP SERPL CALCULATED.3IONS-SCNC: 11 MMOL/L (ref 10–20)
APPEARANCE UR: CLEAR
BILIRUB UR STRIP.AUTO-MCNC: NEGATIVE MG/DL
BUN SERPL-MCNC: 9 MG/DL (ref 6–23)
CALCIUM SERPL-MCNC: 9.8 MG/DL (ref 8.6–10.3)
CHLORIDE SERPL-SCNC: 104 MMOL/L (ref 98–107)
CO2 SERPL-SCNC: 26 MMOL/L (ref 21–32)
COLOR UR: ABNORMAL
CREAT SERPL-MCNC: 0.86 MG/DL (ref 0.5–1.3)
EGFRCR SERPLBLD CKD-EPI 2021: >90 ML/MIN/1.73M*2
GLUCOSE SERPL-MCNC: 98 MG/DL (ref 74–99)
GLUCOSE UR STRIP.AUTO-MCNC: NORMAL MG/DL
KETONES UR STRIP.AUTO-MCNC: NEGATIVE MG/DL
LEUKOCYTE ESTERASE UR QL STRIP.AUTO: ABNORMAL
MUCOUS THREADS #/AREA URNS AUTO: NORMAL /LPF
NITRITE UR QL STRIP.AUTO: NEGATIVE
PH UR STRIP.AUTO: 5.5 [PH]
POTASSIUM SERPL-SCNC: 3.7 MMOL/L (ref 3.5–5.3)
PROT UR STRIP.AUTO-MCNC: NEGATIVE MG/DL
RBC # UR STRIP.AUTO: NEGATIVE MG/DL
RBC #/AREA URNS AUTO: NORMAL /HPF
SODIUM SERPL-SCNC: 137 MMOL/L (ref 136–145)
SP GR UR STRIP.AUTO: 1.01
UROBILINOGEN UR STRIP.AUTO-MCNC: NORMAL MG/DL
WBC #/AREA URNS AUTO: NORMAL /HPF

## 2025-07-28 PROCEDURE — 2500000004 HC RX 250 GENERAL PHARMACY W/ HCPCS (ALT 636 FOR OP/ED): Performed by: STUDENT IN AN ORGANIZED HEALTH CARE EDUCATION/TRAINING PROGRAM

## 2025-07-28 PROCEDURE — 87086 URINE CULTURE/COLONY COUNT: CPT | Mod: WESLAB | Performed by: STUDENT IN AN ORGANIZED HEALTH CARE EDUCATION/TRAINING PROGRAM

## 2025-07-28 PROCEDURE — 3074F SYST BP LT 130 MM HG: CPT | Performed by: INTERNAL MEDICINE

## 2025-07-28 PROCEDURE — 81001 URINALYSIS AUTO W/SCOPE: CPT | Performed by: STUDENT IN AN ORGANIZED HEALTH CARE EDUCATION/TRAINING PROGRAM

## 2025-07-28 PROCEDURE — 99213 OFFICE O/P EST LOW 20 MIN: CPT | Performed by: INTERNAL MEDICINE

## 2025-07-28 PROCEDURE — 3079F DIAST BP 80-89 MM HG: CPT | Performed by: INTERNAL MEDICINE

## 2025-07-28 RX ADMIN — SODIUM CHLORIDE 1000 ML: 900 INJECTION, SOLUTION INTRAVENOUS at 00:27

## 2025-07-28 ASSESSMENT — ENCOUNTER SYMPTOMS
PALPITATIONS: 0
COUGH: 0
CHILLS: 0
SHORTNESS OF BREATH: 0
POLYDIPSIA: 0
FEVER: 0

## 2025-07-28 ASSESSMENT — PAIN SCALES - GENERAL: PAINLEVEL_OUTOF10: 0 - NO PAIN

## 2025-07-28 ASSESSMENT — PAIN - FUNCTIONAL ASSESSMENT: PAIN_FUNCTIONAL_ASSESSMENT: 0-10

## 2025-07-28 NOTE — LETTER
July 28, 2025     Patient: Cameron Agarwal   YOB: 1980   Date of Visit: 7/28/2025       To Whom It May Concern:    Cameron Agarwal was seen in my clinic on 7/28/2025 at 11:00 am. Please excuse Cameron for Cameron's absence from work on this day to make the appointment.     Return to work Wednesday 7/30/2025 with full clearance.     If you have any questions or concerns, please don't hesitate to call.         Sincerely,         Xu Bethea MD        CC: No Recipients

## 2025-07-28 NOTE — DISCHARGE INSTRUCTIONS
Follow up with your doctor(s) in one to two days.  Follow up and review all labs and imaging results with your doctor(s)    You performed testing on your electrolytes, your kidney function, looked for signs of dehydration, these looked normal.  There is also no signs of infection, and no signs of anemia.     Please return to this or the nearest Emergency Medical facility for any new or worsening symptoms.    For all prescription medications, you should review all risks and side effects on the package insert and discuss these and the medication with your pharmacist and primary care physician.

## 2025-07-28 NOTE — PROGRESS NOTES
Subjective   Patient ID: Cameron Agarwal is a 45 y.o. male who presents for Follow-up (Patient's states that he just doesn't feel right).    Pt presents for an acute appointment   He has vague non specific symptoms that he cannot well describe.   He reports dry mouth, started prior to new medication. No new symptoms onset since starting gabapentin.   Low energy reported. Low appetite.     No cough or breathing issues.   No skin changes or urinary symptoms other then drinking a lot of water and urinating to compensate.   No symptoms of URI when first discussed. Late in the visit he added a mild sore throat.     Went to ER prior. Labs and evaluation there reviewed and normal.     Patient is heavily focused on work leave during encounter, mentions multiple times. He has had other recent visits working towards extended work leave without necessity in my medical opinion as well.          Review of Systems   Constitutional:  Negative for chills and fever.   Respiratory:  Negative for cough and shortness of breath.    Cardiovascular:  Negative for chest pain and palpitations.   Endocrine: Negative for polydipsia and polyuria.       Objective   /82   Pulse 69   Temp 36.6 °C (97.8 °F) (Temporal)     Physical Exam  Constitutional:       Appearance: Normal appearance.   HENT:      Head: Normocephalic and atraumatic.     Eyes:      Extraocular Movements: Extraocular movements intact.      Pupils: Pupils are equal, round, and reactive to light.     Neck:      Thyroid: No thyroid mass or thyromegaly.      Vascular: No carotid bruit.     Cardiovascular:      Rate and Rhythm: Normal rate and regular rhythm.      Heart sounds: No murmur heard.     No friction rub. No gallop.   Pulmonary:      Effort: No respiratory distress.      Breath sounds: No wheezing, rhonchi or rales.     Musculoskeletal:      Cervical back: Neck supple.      Right lower leg: No edema.      Left lower leg: No edema.   Lymphadenopathy:      Cervical:  No cervical adenopathy.     Neurological:      Mental Status: Cameron is alert.         Assessment/Plan   Assessment & Plan  Viral URI

## 2025-07-28 NOTE — ED PROVIDER NOTES
"EMERGENCY DEPARTMENT ENCOUNTER      Pt Name: Cameron Agarwal  MRN: [id number]  Birthdate 1980  Date of evaluation: 7/27/2025  Provider: Ekta Don DO         Chief Complaint   Patient presents with    Dehydration     Pt presents to the ED with c/o dehydrations. Per pt, he was outside in the heat for a long period of time on Thursday and as since been experiencing dry mouth and he feels like he is dehydrated. Pt states he has been drinking a lot of water for still feels dehydrated. Pt reports hx of htn.        HPI     45 male presenting to the emergency department for evaluation of feeling like he is dehydrated.  He does not describe specific symptoms, and when questioned repeatedly what his symptoms are, he states that he has been feeling \"like this.\"  When asked to elaborate he states that it is just like he \"is dehydrated.\"  No chest pain, no difficulty breathing, no nausea, no vomiting, no headaches              Patient History   Medical History[1]  Surgical History[2]  Family History[3]  Social History[4]    Physical Exam   ED Triage Vitals [07/27/25 2312]   Temperature Heart Rate Respirations BP   37 °C (98.6 °F) 75 18 130/86      Pulse Ox Temp Source Heart Rate Source Patient Position   100 % Oral -- --      BP Location FiO2 (%)     -- --       Physical Exam  Vitals and nursing note reviewed.   Constitutional:       General: Cameron is not in acute distress.  HENT:      Head: Normocephalic and atraumatic.     Eyes:      Extraocular Movements: Extraocular movements intact.      Pupils: Pupils are equal, round, and reactive to light.       Cardiovascular:      Rate and Rhythm: Normal rate and regular rhythm.   Pulmonary:      Effort: Pulmonary effort is normal. No respiratory distress.      Breath sounds: No decreased breath sounds.     Musculoskeletal:      Cervical back: Normal range of motion.     Skin:     General: Skin is warm and dry.      Capillary Refill: Capillary refill takes less than 2 " seconds.     Neurological:      General: No focal deficit present.      Mental Status: Cameron is alert and oriented to person, place, and time.     Psychiatric:      Comments: Perseverates         Results:  Abnormal Labs Reviewed   CBC WITH AUTO DIFFERENTIAL - Abnormal; Notable for the following components:       Result Value    RDW 14.6 (*)     All other components within normal limits   URINALYSIS WITH REFLEX CULTURE AND MICROSCOPIC - Abnormal; Notable for the following components:    Leukocyte Esterase, Urine 25 Honorio/uL (*)     All other components within normal limits       All other labs were normal or not returned as of the time of this dictation.     No orders to display         ED Course & MDM   Cameron Agarwal is a 45 y.o. male presenting to the ED for evaluation of had concerns including Dehydration (Pt presents to the ED with c/o dehydrations. Per pt, he was outside in the heat for a long period of time on Thursday and as since been experiencing dry mouth and he feels like he is dehydrated. Pt states he has been drinking a lot of water for still feels dehydrated. Pt reports hx of htn. ).. External records reviewed: I reviewed external records including outpatient, PCP records, and prior discharge summaries.    Medical Decision Making  Given IV bag fluids, basic labs obtained and show no acute electrolyte derangements, normal kidney function, no leukocytosis, no signs of anemia no thrombocytopenia.  On reevaluation, patient states he feels the same.  Reassured that his laboratory studies showed no kidney dysfunction and no signs of dehydration at this time, and he was discharged in stable condition with instructions to follow-up with his primary care provider        Diagnoses as of 07/28/25 0154   Malaise   Dry mouth           Procedure  Procedures            Ekta Don DO  Emergency Medicine    The above documentation was completed with the use of speech recognition software. It may contain dictation  errors secondary to limitations of the software.      ED Medications administered this visit:    Medications   sodium chloride 0.9 % bolus 1,000 mL (1,000 mL intravenous New Bag 7/28/25 0027)       New Prescriptions from this visit:    New Prescriptions    No medications on file       Final Impression:   1. Malaise    2. Dry mouth          (Please note that portions of this note were completed with a voice recognition program.  Efforts were made to edit the dictations but occasionally words are mis-transcribed.)       [1] No past medical history on file.  [2] No past surgical history on file.  [3]   Family History  Problem Relation Name Age of Onset    Colon cancer Neg Hx     [4]   Social History  Tobacco Use    Smoking status: Every Day     Current packs/day: 0.25     Types: Cigarettes     Passive exposure: Past    Smokeless tobacco: Never    Tobacco comments:     Working to stop. Down to under 1/4 ppd   Vaping Use    Vaping status: Never Used   Substance Use Topics    Alcohol use: Yes     Comment: occasional    Drug use: Never        Ekta Don DO  07/28/25 0154

## 2025-07-29 LAB — BACTERIA UR CULT: NO GROWTH

## 2025-08-06 ENCOUNTER — OFFICE VISIT (OUTPATIENT)
Dept: URGENT CARE | Age: 45
End: 2025-08-06
Payer: COMMERCIAL

## 2025-08-06 VITALS
DIASTOLIC BLOOD PRESSURE: 63 MMHG | RESPIRATION RATE: 17 BRPM | TEMPERATURE: 97.7 F | SYSTOLIC BLOOD PRESSURE: 104 MMHG | HEART RATE: 81 BPM | OXYGEN SATURATION: 97 %

## 2025-08-06 DIAGNOSIS — J02.9 SORE THROAT: ICD-10-CM

## 2025-08-06 LAB
POC HUMAN RHINOVIRUS PCR: NEGATIVE
POC INFLUENZA A VIRUS PCR: NEGATIVE
POC INFLUENZA B VIRUS PCR: NEGATIVE
POC RESPIRATORY SYNCYTIAL VIRUS PCR: NEGATIVE
POC STREPTOCOCCUS PYOGENES (GROUP A STREP) PCR: NEGATIVE

## 2025-08-06 PROCEDURE — 87651 STREP A DNA AMP PROBE: CPT | Performed by: FAMILY MEDICINE

## 2025-08-06 PROCEDURE — 87631 RESP VIRUS 3-5 TARGETS: CPT | Performed by: FAMILY MEDICINE

## 2025-08-06 PROCEDURE — 99203 OFFICE O/P NEW LOW 30 MIN: CPT | Performed by: FAMILY MEDICINE

## 2025-08-06 PROCEDURE — 3074F SYST BP LT 130 MM HG: CPT | Performed by: FAMILY MEDICINE

## 2025-08-06 PROCEDURE — 3078F DIAST BP <80 MM HG: CPT | Performed by: FAMILY MEDICINE

## 2025-08-06 RX ORDER — PREDNISONE 20 MG/1
20 TABLET ORAL
Qty: 5 TABLET | Refills: 0 | Status: SHIPPED | OUTPATIENT
Start: 2025-08-06 | End: 2025-08-11

## 2025-08-06 ASSESSMENT — PAIN SCALES - GENERAL: PAINLEVEL_OUTOF10: 7

## 2025-08-06 NOTE — PROGRESS NOTES
HPI:  Patient states that he was on gabapentin for 5 days and had nausea and dry mouth while taking it.  Pt states that he stopped medication and for the past week noticed ST.  No fever/chills.  No sob or cp.  +pain with swallowing.  No known sick contacts.  Around dust at work.  Pt thinks that his symptoms can be secondary to medication or infection.    ROS:  No fever/chills  No n/v  No sob    PE:    A&O x3  NCAT  PERRLA, EOMI  TM clear bl  No pharyngeal erythema or swelling or exudates, uvula midline  RRR  CTAB  MOEx4  No focal deficit  Judgement normal  No submandibular nodes    Results:  [facility]fire: strep/rsv/flu/rhino -    A/P:   Pharyngitis     Your strep test was negative.  Increase fluids.  Gargle with warm water or drink tea with honey.  Throat lozanges. If no improvement in 2 days trial of steroid.  Keep a diary of symptoms. Recheck with your doctor in 5-7 days if no improvement.  Go to the ER if starts getting worse.

## 2025-08-06 NOTE — LETTER
August 6, 2025     Patient: Cameron Agarwal   YOB: 1980   Date of Visit: 8/6/2025       To Whom It May Concern:    Cameron Agarwal was seen in my clinic on 8/6/2025 at 6:25 pm. Please excuse Cameron for Camreon's absence from work on this day to make the appointment. He can return to work on 08/08/2025.    If you have any questions or concerns, please don't hesitate to call.         Sincerely,         Rachael Godoy MD        CC: No Recipients

## 2025-08-08 ENCOUNTER — HOSPITAL ENCOUNTER (OUTPATIENT)
Dept: NEUROLOGY | Facility: CLINIC | Age: 45
Discharge: HOME | End: 2025-08-08
Payer: COMMERCIAL

## 2025-08-08 DIAGNOSIS — G62.9 POLYNEUROPATHY: ICD-10-CM

## 2025-08-08 PROCEDURE — 95911 NRV CNDJ TEST 9-10 STUDIES: CPT | Performed by: PSYCHIATRY & NEUROLOGY

## 2025-08-08 PROCEDURE — 95886 MUSC TEST DONE W/N TEST COMP: CPT | Performed by: PSYCHIATRY & NEUROLOGY

## 2025-08-08 PROCEDURE — 95911 NRV CNDJ TEST 9-10 STUDIES: CPT | Mod: GC | Performed by: PSYCHIATRY & NEUROLOGY

## 2025-08-11 ENCOUNTER — APPOINTMENT (OUTPATIENT)
Facility: CLINIC | Age: 45
End: 2025-08-11
Payer: COMMERCIAL

## 2025-09-10 ENCOUNTER — APPOINTMENT (OUTPATIENT)
Dept: PRIMARY CARE | Facility: CLINIC | Age: 45
End: 2025-09-10
Payer: COMMERCIAL